# Patient Record
Sex: MALE | Race: WHITE | Employment: FULL TIME | URBAN - NONMETROPOLITAN AREA
[De-identification: names, ages, dates, MRNs, and addresses within clinical notes are randomized per-mention and may not be internally consistent; named-entity substitution may affect disease eponyms.]

---

## 2019-03-28 ENCOUNTER — OFFICE VISIT (OUTPATIENT)
Dept: URGENT CARE | Age: 49
End: 2019-03-28
Payer: OTHER GOVERNMENT

## 2019-03-28 VITALS
TEMPERATURE: 99 F | BODY MASS INDEX: 23.77 KG/M2 | HEART RATE: 98 BPM | DIASTOLIC BLOOD PRESSURE: 108 MMHG | OXYGEN SATURATION: 98 % | RESPIRATION RATE: 18 BRPM | WEIGHT: 166 LBS | SYSTOLIC BLOOD PRESSURE: 158 MMHG | HEIGHT: 70 IN

## 2019-03-28 DIAGNOSIS — F17.200 SMOKER: ICD-10-CM

## 2019-03-28 DIAGNOSIS — J06.9 URI, ACUTE: ICD-10-CM

## 2019-03-28 DIAGNOSIS — L02.01 ABSCESS OF FACE: Primary | ICD-10-CM

## 2019-03-28 PROCEDURE — 99214 OFFICE O/P EST MOD 30 MIN: CPT | Performed by: SPECIALIST

## 2019-03-28 PROCEDURE — 96372 THER/PROPH/DIAG INJ SC/IM: CPT | Performed by: SPECIALIST

## 2019-03-28 RX ORDER — SULFAMETHOXAZOLE AND TRIMETHOPRIM 800; 160 MG/1; MG/1
1 TABLET ORAL 2 TIMES DAILY
Qty: 20 TABLET | Refills: 0 | Status: SHIPPED | OUTPATIENT
Start: 2019-03-28 | End: 2019-04-07

## 2019-03-28 RX ORDER — CEFTRIAXONE 500 MG/1
500 INJECTION, POWDER, FOR SOLUTION INTRAMUSCULAR; INTRAVENOUS ONCE
Status: COMPLETED | OUTPATIENT
Start: 2019-03-28 | End: 2019-03-28

## 2019-03-28 RX ORDER — AMLODIPINE BESYLATE AND BENAZEPRIL HYDROCHLORIDE 10; 40 MG/1; MG/1
1 CAPSULE ORAL DAILY
COMMUNITY

## 2019-03-28 RX ORDER — MUPIROCIN CALCIUM 20 MG/G
CREAM TOPICAL
Qty: 30 G | Refills: 0 | Status: SHIPPED | OUTPATIENT
Start: 2019-03-28 | End: 2021-08-06

## 2019-03-28 RX ADMIN — CEFTRIAXONE 500 MG: 500 INJECTION, POWDER, FOR SOLUTION INTRAMUSCULAR; INTRAVENOUS at 17:55

## 2019-03-28 ASSESSMENT — ENCOUNTER SYMPTOMS
COUGH: 1
SORE THROAT: 1

## 2019-04-16 ENCOUNTER — OFFICE VISIT (OUTPATIENT)
Dept: URGENT CARE | Age: 49
End: 2019-04-16
Payer: OTHER GOVERNMENT

## 2019-04-16 VITALS
DIASTOLIC BLOOD PRESSURE: 96 MMHG | OXYGEN SATURATION: 98 % | TEMPERATURE: 98.6 F | HEIGHT: 70 IN | WEIGHT: 165 LBS | HEART RATE: 104 BPM | SYSTOLIC BLOOD PRESSURE: 150 MMHG | RESPIRATION RATE: 18 BRPM | BODY MASS INDEX: 23.62 KG/M2

## 2019-04-16 DIAGNOSIS — T24.202A SECOND DEGREE BURN OF LEFT LEG, INITIAL ENCOUNTER: Primary | ICD-10-CM

## 2019-04-16 PROCEDURE — 99213 OFFICE O/P EST LOW 20 MIN: CPT | Performed by: NURSE PRACTITIONER

## 2019-04-16 RX ORDER — CHLORHEXIDINE GLUCONATE 4 G/100ML
SOLUTION TOPICAL
Qty: 118 ML | Refills: 0 | Status: SHIPPED | OUTPATIENT
Start: 2019-04-16 | End: 2019-04-30

## 2019-04-16 RX ORDER — CLINDAMYCIN HYDROCHLORIDE 300 MG/1
300 CAPSULE ORAL 3 TIMES DAILY
Qty: 14 CAPSULE | Refills: 0 | Status: SHIPPED | OUTPATIENT
Start: 2019-04-16 | End: 2019-04-19 | Stop reason: SDUPTHER

## 2019-04-16 ASSESSMENT — ENCOUNTER SYMPTOMS
EYES NEGATIVE: 1
BURN: 1
COLOR CHANGE: 1
RESPIRATORY NEGATIVE: 1

## 2019-04-16 NOTE — PROGRESS NOTES
[id number] [facility], [place]     Referral Priority:   Urgent     Referral Type:   Eval and Treat     Referral Reason:   Specialty Services Required     Requested Specialty:   Wound Care     Number of Visits Requested:   1        No follow-ups on file. Orders Placed This Encounter   Procedures   [address] [facility], [place]     Referral Priority:   Urgent     Referral Type:   Eval and Treat     Referral Reason:   Specialty Services Required     Requested Specialty:   Wound Care     Number of Visits Requested:   1     Orders Placed This Encounter   Medications    chlorhexidine (HIBICLENS) 4 % external liquid     Sig: Apply topically daily as needed. Dispense:  118 mL     Refill:  0    clindamycin (CLEOCIN) 300 MG capsule     Sig: Take 1 capsule by mouth 3 times daily for 7 days     Dispense:  14 capsule     Refill:  0    silver sulfADIAZINE (SILVADENE) 1 % cream     Sig: Apply topically daily. Dispense:  25 g     Refill:  0       Patient given educationalmaterials - see patient instructions. Discussed use, benefit, and side effectsof prescribed medications. All patient questions answered. Pt voiced understanding. Reviewed health maintenance. Instructed to continue current medications, diet andexercise. Patient agreed with treatment plan. Follow up as directed. Patient Instructions   1. Take antibiotic as prescribed  2. Apply silvadene cream as directed  3. Wash 4 times a day with hibiclens  4. Follow up with wound care appt  5. Go to ER with increased signs of infection: fever, streaks, swelling  6. Follow up with PCP as needed  7.  Follow up with UC as needed        Electronically signed by KEVLIN Durant CNP on 4/16/2019 at 5:45 PM

## 2019-04-18 ENCOUNTER — HOSPITAL ENCOUNTER (OUTPATIENT)
Dept: WOUND CARE | Age: 49
Discharge: HOME | End: 2019-04-18
Payer: OTHER GOVERNMENT

## 2019-04-18 VITALS — RESPIRATION RATE: 18 BRPM | HEART RATE: 78 BPM | TEMPERATURE: 97.5 F

## 2019-04-18 DIAGNOSIS — L97.812 NON-PRESSURE CHRONIC ULCER OF OTHER PART OF RIGHT LOWER LEG WITH FAT LAYER EXPOSED (HCC): ICD-10-CM

## 2019-04-18 DIAGNOSIS — T24.031A BURN OF RIGHT LOWER LEG, INITIAL ENCOUNTER: ICD-10-CM

## 2019-04-18 PROCEDURE — 99213 OFFICE O/P EST LOW 20 MIN: CPT

## 2019-04-18 PROCEDURE — 99214 OFFICE O/P EST MOD 30 MIN: CPT | Performed by: NURSE PRACTITIONER

## 2019-04-18 NOTE — PLAN OF CARE
Problem: Wound:  Goal: Will show signs of wound healing; wound closure and no evidence of infection  Description  Will show signs of wound healing; wound closure and no evidence of infection  Outcome: Ongoing     Problem: Smoking cessation:  Goal: Ability to formulate a plan to maintain a tobacco-free life will be supported  Description  Ability to formulate a plan to maintain a tobacco-free life will be supported  Outcome: Ongoing
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
## 2019-04-19 ENCOUNTER — TELEPHONE (OUTPATIENT)
Dept: URGENT CARE | Age: 49
End: 2019-04-19

## 2019-04-19 DIAGNOSIS — T24.202A SECOND DEGREE BURN OF LEFT LEG, INITIAL ENCOUNTER: ICD-10-CM

## 2019-04-19 RX ORDER — CLINDAMYCIN HYDROCHLORIDE 300 MG/1
300 CAPSULE ORAL 3 TIMES DAILY
Qty: 9 CAPSULE | Refills: 0 | Status: SHIPPED | OUTPATIENT
Start: 2019-04-19 | End: 2019-04-22

## 2019-04-19 NOTE — TELEPHONE ENCOUNTER
Patient called in, said that his order for the antibiotic was only written for 14 tablets but should have been for 21. Said that the pharmacy will not give him the 7 other tablets without a new order.  Told him that I would send a message to one of our providers and we will call him back.--HC, LPN

## 2021-08-03 DIAGNOSIS — D75.1 POLYCYTHEMIA: Primary | ICD-10-CM

## 2021-08-05 NOTE — PROGRESS NOTES
OP HEMATOLOGY/ONCOLOGY CONSULTATION      Pt Name: Kolton Puri  YOB: 1970  MRN: [id number]  Referring provider: Layo Avalos   PCP: Bailey Leija      Date of evaluation: 8/6/2021   History Obtained From:  patient, electronic medical record    CHIEF COMPLAINT:    Chief Complaint   Patient presents with    New Patient     D75.1 Polycythemia Worsening without clear etiology     HISTORY OF PRESENT ILLNESS:    Kolton Puri is a 46 y.o.  male referred by Dr. Abad Roach for evaluation polycythemia. CBC 7/13/2021 revealed WBC 6.9 with 62% PMN, 20% lymphocyte, 8% monocyte, 2% eosinophil, 0% basophil, Hgb 20.4, HCT 57.3, ,000. CMP 7/13/2021 negative    He does smoke 1 pack/day of cigars a day at present. Prior to that 10 years ago he was smoking 1.5 pack of cigarettes per day since he was a teenager. He previously received testosterone injections but has not received any since 2018. He was sent for a therapeutic phlebotomy at [facility] 2 weeks ago. He did have the phlebotomy and he noticed that he was having chest pressure prior to the phlebotomy and that symptoms subsided. He did previously work for [employer] but has not had any of the screening CT imaging of the chest performed. He has hypertension and is on Lotrel. He does take Zoloft for anxiety.     Past Medical History:   Diagnosis Date    Depression     Hyperlipidemia     Hypertension        Past Surgical History:   Procedure Laterality Date    ANKLE SURGERY      HERNIA REPAIR      TONSILLECTOMY           Current Outpatient Medications:     amLODIPine-benazepril (LOTREL) 10-40 MG per capsule, Take 1 capsule by mouth daily, Disp: , Rfl:     sertraline (ZOLOFT) 100 MG tablet, , Disp: , Rfl: 3     No Known Allergies    Social History     Tobacco Use    Smoking status: Current Every Day Smoker     Packs/day: 1.00     Years: 15.00     Pack years: 15.00    Smokeless tobacco: Former User   Vaping Use    Vaping Use: Never used   Substance Use Topics    Alcohol use: Yes     Alcohol/week: 21.0 standard drinks     Types: 21 Cans of beer per week    Drug use: No       Family History   Problem Relation Age of Onset    Stroke Mother     High Blood Pressure Mother     Diabetes Mother     High Blood Pressure Father     Cancer Neg Hx     Kidney Disease Neg Hx        Subjective   REVIEW OF SYSTEMS:   Review of Systems   Constitutional: Negative for chills, diaphoresis, fatigue, fever and unexpected weight change. HENT: Negative for mouth sores, nosebleeds, sore throat, trouble swallowing and voice change. Eyes: Negative for photophobia, discharge and itching. Respiratory: Negative for cough, shortness of breath and wheezing. Cardiovascular: Negative for chest pain, palpitations and leg swelling. Gastrointestinal: Positive for diarrhea. Negative for abdominal distention, abdominal pain, blood in stool, constipation, nausea and vomiting. Endocrine: Negative for cold intolerance, heat intolerance, polydipsia and polyuria. Genitourinary: Negative for difficulty urinating, dysuria, hematuria and urgency. Musculoskeletal: Negative for arthralgias, back pain, joint swelling and myalgias. Skin: Negative for color change and rash. Neurological: Negative for dizziness, tremors, seizures, syncope and light-headedness. Hematological: Negative for adenopathy. Does not bruise/bleed easily. Psychiatric/Behavioral: Negative for behavioral problems and suicidal ideas. The patient is not nervous/anxious. All other systems reviewed and are negative. Objective   BP (!) 142/86   Pulse 96   Ht 5' 10\" (1.778 m)   Wt 164 lb 3.2 oz (74.5 kg)   SpO2 96%   BMI 23.56 kg/m²     PHYSICAL EXAM:  Physical Exam  Vitals reviewed. Constitutional:       General: He is not in acute distress. Appearance: He is well-developed. HENT:      Head: Normocephalic and atraumatic. Nose: Nose normal.   Eyes:      General: No scleral icterus. Conjunctiva/sclera: Conjunctivae normal.   Neck:      Vascular: No JVD. Trachea: No tracheal deviation. Cardiovascular:      Rate and Rhythm: Normal rate and regular rhythm. Heart sounds: Normal heart sounds. No murmur heard. Pulmonary:      Effort: Pulmonary effort is normal. No respiratory distress. Breath sounds: Normal breath sounds. No wheezing. Abdominal:      General: Bowel sounds are normal. There is no distension. Palpations: Abdomen is soft. There is no splenomegaly or mass. Tenderness: There is no abdominal tenderness. Musculoskeletal:         General: No tenderness or deformity. Normal range of motion. Lymphadenopathy:      Cervical: No cervical adenopathy. Upper Body:      Right upper body: No supraclavicular or axillary adenopathy. Left upper body: Axillary adenopathy (1 cm-shoddy) present. No supraclavicular adenopathy. Lower Body: No right inguinal adenopathy. No left inguinal adenopathy. Skin:     Findings: No erythema or rash. Neurological:      Mental Status: He is alert and oriented to person, place, and time. Psychiatric:         Thought Content: Thought content normal.     CBC reviewed by me  Lab Results   Component Value Date    WBC 6.38 08/06/2021    HGB 18.8 (HH) 08/06/2021    HCT 56.0 (HH) 08/06/2021    MCV 95.4 (H) 08/06/2021     08/06/2021     Lab Results   Component Value Date    NEUTROABS 3.33 08/06/2021       VISIT DIAGNOSES  1. Polycythemia        ASSESSMENT/PLAN:    1. Polycythemia    CBC 7/13/2021 revealed WBC 6.9 with 62% PMN, 20% lymphocyte, 8% monocyte, 2% eosinophil, 0% basophil, Hgb 20.4, HCT 57.3, ,000. CMP 7/13/2021 negative    He does smoke 1 pack/day of cigars a day at present. Prior to that 10 years ago he was smoking 1.5 pack of cigarettes per day since he was a teenager.     He previously received testosterone injections but has not received any since 2018. He was sent for a therapeutic phlebotomy at [facility] 2 weeks ago. He did have the phlebotomy and he noticed that he was having chest pressure prior to the phlebotomy and that symptoms subsided. He did previously work for [employer] but has not had any of the screening CT imaging of the chest performed. CBC today reveals a normal WBC of 6.38 with normal percent differential.  PLT is normal at 190,000. Hgb elevated at 18.8, HCT 56. He denies any headaches, chest tightness today. He does feel as if he cannot get in a good deep breath. I discussed potential causes-pulmonary etiology, bone marrow disorders-myeloproliferative disorders. JAK2 with reflex will be requested today. Erythropoietin level will be requested as well. At present we will phlebotomize him to get his hematocrit below 53 however if his JAK2 is positive our goal will be to get him below 45. He does not have any splenomegaly on exam.    2.  Nicotine dependence. I encouraged a chest x-ray. He does not qualify for low-dose CT imaging yet since he has not not reached 52years of age. He is going to try to see if he can get a screening CT performed through the workers health program since he was an employee at [employer]. Smoking cessation was encouraged but he does not have any plans to stop smoking at present. · Prostate cancer screening-defer to PCP    · Colon cancer screen. He has never had a colonoscopy. He denies any family history of colon cancer. He does have Cologuard box that he is going to be sending in. Thank you Dr. Flaco Lemons for referring Zulma Phillip for evaluation. Orders Placed This Encounter   Procedures    Ferritin    Iron and TIBC    Erythropoietin    Miscellaneous Sendout 1        Return in about 2 weeks (around 8/20/2021) for With Dorcas  and therapeutic phlebotomy.       Ed Baer PA-C  11:05 AM  8/6/2021

## 2021-08-06 ENCOUNTER — OFFICE VISIT (OUTPATIENT)
Dept: HEMATOLOGY | Age: 51
End: 2021-08-06
Payer: OTHER GOVERNMENT

## 2021-08-06 ENCOUNTER — HOSPITAL ENCOUNTER (OUTPATIENT)
Dept: INFUSION THERAPY | Age: 51
Discharge: HOME | End: 2021-08-06
Payer: OTHER GOVERNMENT

## 2021-08-06 VITALS
BODY MASS INDEX: 23.51 KG/M2 | HEART RATE: 96 BPM | WEIGHT: 164.2 LBS | HEIGHT: 70 IN | SYSTOLIC BLOOD PRESSURE: 142 MMHG | OXYGEN SATURATION: 96 % | DIASTOLIC BLOOD PRESSURE: 86 MMHG

## 2021-08-06 DIAGNOSIS — D75.1 POLYCYTHEMIA: Primary | ICD-10-CM

## 2021-08-06 DIAGNOSIS — D75.1 POLYCYTHEMIA: ICD-10-CM

## 2021-08-06 LAB
BASOPHILS ABSOLUTE: 0.03 K/UL (ref 0.01–0.08)
BASOPHILS RELATIVE PERCENT: 0.5 % (ref 0.1–1.2)
EOSINOPHILS ABSOLUTE: 0.23 K/UL (ref 0.04–0.54)
EOSINOPHILS RELATIVE PERCENT: 3.6 % (ref 0.7–7)
HCT VFR BLD CALC: 56 % (ref 40.1–51)
HEMOGLOBIN: 18.8 G/DL (ref 13.7–17.5)
LYMPHOCYTES ABSOLUTE: 2.29 K/UL (ref 1.18–3.74)
LYMPHOCYTES RELATIVE PERCENT: 35.9 % (ref 19.3–53.1)
MCH RBC QN AUTO: 32 PG (ref 25.7–32.2)
MCHC RBC AUTO-ENTMCNC: 33.6 G/DL (ref 32.3–36.5)
MCV RBC AUTO: 95.4 FL (ref 79–92.2)
MONOCYTES ABSOLUTE: 0.5 K/UL (ref 0.24–0.82)
MONOCYTES RELATIVE PERCENT: 7.8 % (ref 4.7–12.5)
NEUTROPHILS ABSOLUTE: 3.33 K/UL (ref 1.56–6.13)
NEUTROPHILS RELATIVE PERCENT: 52.2 % (ref 34–71.1)
PDW BLD-RTO: 13.4 % (ref 11.6–14.4)
PLATELET # BLD: 198 K/UL (ref 163–337)
PMV BLD AUTO: 10.4 FL (ref 7.4–10.4)
RBC # BLD: 5.87 M/UL (ref 4.63–6.08)
WBC # BLD: 6.38 K/UL (ref 4.23–9.07)

## 2021-08-06 PROCEDURE — 99195 PHLEBOTOMY: CPT

## 2021-08-06 PROCEDURE — 99203 OFFICE O/P NEW LOW 30 MIN: CPT | Performed by: PHYSICIAN ASSISTANT

## 2021-08-06 PROCEDURE — 99212 OFFICE O/P EST SF 10 MIN: CPT

## 2021-08-06 PROCEDURE — 85025 COMPLETE CBC W/AUTO DIFF WBC: CPT

## 2021-08-06 PROCEDURE — 36415 COLL VENOUS BLD VENIPUNCTURE: CPT

## 2021-08-06 ASSESSMENT — ENCOUNTER SYMPTOMS
PHOTOPHOBIA: 0
EYE ITCHING: 0
TROUBLE SWALLOWING: 0
ABDOMINAL DISTENTION: 0
VOMITING: 0
COLOR CHANGE: 0
NAUSEA: 0
VOICE CHANGE: 0
DIARRHEA: 1
COUGH: 0
EYE DISCHARGE: 0
BACK PAIN: 0
ABDOMINAL PAIN: 0
BLOOD IN STOOL: 0
SHORTNESS OF BREATH: 0
SORE THROAT: 0
CONSTIPATION: 0
WHEEZING: 0

## 2021-08-06 NOTE — PATIENT INSTRUCTIONS
Patient Education        Polycythemia: Care Instructions  Your Care Instructions     Polycythemia (say \"paw-sonya-sy-THEE-mak-uh) is an abnormal increase in red blood cells. It happens when the tissue inside your bones (bone marrow) makes too much blood. It also can occur if your blood does not have enough liquid, or plasma. This can make the number of red blood cells seem higher than normal. The extra red blood cells make your blood thicker than normal. This may raise your risk for blood clots that can cause heart attacks or strokes. Clots can form in the deep veins of the body, a condition called deep vein thrombosis. Or, a clot can travel through the blood to a lung (a pulmonary embolism). Your doctor may treat you by taking out some of your blood (phlebotomy). The process is like donating blood. Your doctor may even recommend that you donate blood. You may take pills to stop your body from making red blood cells. You also will get treatment for any other conditions that may cause your body to make too many red blood cells. Follow-up care is a key part of your treatment and safety. Be sure to make and go to all appointments, and call your doctor if you are having problems. It's also a good idea to know your test results and keep a list of the medicines you take. How can you care for yourself at home? · Be safe with medicines. Take your medicines exactly as prescribed. Call your doctor if you think you are having a problem with your medicine. · Drink plenty of fluids before and after you have blood removed. If you have kidney, heart, or liver disease and have to limit fluids, talk with your doctor before you increase the amount of fluids you drink. · Take it easy after you have had blood removed. Do not do vigorous exercise. · If your doctor recommends aspirin, take it exactly as prescribed. Call your doctor if you think you are having a problem with your medicine. · Do not smoke.  Smoking increases the VERIFIED DNR STATUS -2 NURSES AT BS- NO COMPRESSIONS AND NO MEDS- DAUGHTER SAMMY -MEDICAL POA.  NOTIFIED DR JALLOH.   risk of blood clots and may reduce the amount of oxygen in your blood. If you need help quitting, talk to your doctor about stop-smoking programs and medicines. These can increase your chances of quitting for good. · Take an antihistamine, such as a nondrowsy one like loratadine (Claritin) or one that might make you sleepy like diphenhydramine (Benadryl), if your skin is itchy. Some people who have this condition have itching. · Wear medical alert jewelry that lists your clotting problem. You can buy this at most [employer]. When should you call for help? Call 911 anytime you think you may need emergency care. For example, call if:    · You have sudden chest pain and shortness of breath, or you cough up blood.     · You have symptoms of a stroke. These may include:  ? Sudden numbness, tingling, weakness, or loss of movement in your face, arm, or leg, especially on only one side of your body. ? Sudden vision changes. ? Sudden trouble speaking. ? Sudden confusion or trouble understanding simple statements. ? Sudden problems with walking or balance. ? A sudden, severe headache that is different from past headaches.     · You have symptoms of a heart attack. These may include:  ? Chest pain or pressure, or a strange feeling in the chest.  ? Sweating. ? Shortness of breath. ? Nausea or vomiting. ? Pain, pressure, or a strange feeling in the back, neck, jaw, or upper belly or in one or both shoulders or arms. ? Lightheadedness or sudden weakness. ? A fast or irregular heartbeat. After you call 911, the  may tell you to chew 1 adult-strength or 2 to 4 low-dose aspirin. Wait for an ambulance. Do not try to drive yourself. Call your doctor now or seek immediate medical care if:    · You have signs of a blood clot, such as:  ? Pain in your calf, back of knee, thigh, or groin. ? Redness and swelling in your leg or groin.    Watch closely for changes in your health, and be sure to contact your doctor if you have any problems. Where can you learn more? Go to https://chpepiceweb.[email or web address] org and sign in to your [employer] account. Enter Q458 in the [employer] box to learn more about \"Polycythemia: Care Instructions. \"     If you do not have an account, please click on the \"Sign Up Now\" link. Current as of: September 23, 2020               Content Version: 12.9  © 2006-2021 [employer] Inc[employer]. Care instructions adapted under license by [facility] ([facility]). If you have questions about a medical condition or this instruction, always ask your healthcare professional. [address] any warranty or liability for your use of this information.

## 2021-08-06 NOTE — PROGRESS NOTES
THERAPEUTIC PHLEBOTOMY    Most recent Hemoglobin 18.8 Gm/dl and Hematocrit 56.0%    Date obtained: 08 /06 /2021    Pre-phlebotomy Vital Signs: Refer to Doc flow sheet    Start time: 11:05 AM    Tourniquet placed on patient's arm and arm palpated for venous access. Area of venous access cleansed with alcohol. Sheath removed from the needle and needle inserted into the left antecubital site. Blood flowing well down the tubing into collection bag. Adjustment/no adjustment of needle needed to maintain adequate blood flow. Scale monitoring amount of blood in bag. Stop Time: 11:17  Needle removed from patient's arm. Pressure applied to patient's arm until bleeding stopped. Dry sterile dressing applied over puncture site and secured with Coban/self-adherent wrap. Final amount of blood removed: 500ml  Post Vital Signs: Refer to Doc flow sheet      Patient tolerated procedure well. No redness, edema, or signs of active bleeding noted. Discharge Instructions provided: No heavy pushing or lifting for the next 24 hours. Keep dressing dry and in place for 4 to 6 hours. If a fever GREATER than 100.5 develops, contact office. Patient discharged ambulatory with belongings.

## 2021-09-02 NOTE — PROGRESS NOTES
Progress Note      Pt Name: Diane Mcguire  YOB: 1970  MRN: [id number]    Date of evaluation: 9/3/2021  History Obtained From:  patient, electronic medical record    CHIEF COMPLAINT:    Chief Complaint   Patient presents with    Follow-up     Polycythemia     Current active problems  Secondary polycythemia    HISTORY OF PRESENT ILLNESS:    Diane Mcguire is a 46 y.o.  male seen initially in the office on 2021 for evaluation of polycythemia. Baseline serology was obtained and he is here to review the results. He has tolerated therapeutic phlebotomies thus far. He did go and donate blood 2 weeks ago. 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    He has hypertension and is on Lotrel. He does take Zoloft for anxiety. HEMATOLOGY HISTORY: Secondary polycythemia  [facility] was seen in hematology consultation on 2021 referred by Dr. Kristina Smith for evaluation polycythemia.     CBC 2021 revealed WBC 6.9 with 62% PMN, 20% lymphocyte, 8% monocyte, 2% eosinophil, 0% basophil, Hgb 20.4, HCT 57.3, ,000.     CMP 2021 negative     He does smoke 1 pack/day of cigars a day at present. Prior to that 10 years ago he was smoking 1.5 pack of cigarettes per day since he was a teenager.     He previously received testosterone injections but has not received any since 2018.     He was sent for a therapeutic phlebotomy at [facility] 2 weeks ago. He did have the phlebotomy and he noticed that he was having chest pressure prior to the phlebotomy and that symptoms subsided.     He did previously work for [employer] but has not had any of the screening CT imaging of the chest performed.     His initial CBC in the office on 2021 revealed a normal WBC of 6.38 with normal percent differential.  PLT is normal at 190,000. Hgb elevated at 18.8, HCT 56.       He denied any headaches, chest tightness.       He did not have any splenomegaly on physical examination. I discussed potential causes-pulmonary etiology, bone marrow disorders-myeloproliferative disorders. JAK2 with reflex will be requested today. Erythropoietin level will be requested as well. Serology 8/6/2021  Serum Fe - 60  TIBC - 353  Fe sat - 17%  Ferritin - 97    Epo - 9.6 - WNL    LAMONTE 2 - V617F negative  CALR mutation - negative   LAMONTE 2 Exons 12-15 - negative  MPL - negative     At present we will phlebotomize him to get his hematocrit below 53.                 Past Medical History:   Diagnosis Date    Depression     Hyperlipidemia     Hypertension         Past Surgical History:   Procedure Laterality Date    ANKLE SURGERY      HERNIA REPAIR      TONSILLECTOMY             Current Outpatient Medications:     amLODIPine-benazepril (LOTREL) 10-40 MG per capsule, Take 1 capsule by mouth daily, Disp: , Rfl:     sertraline (ZOLOFT) 100 MG tablet, , Disp: , Rfl: 3     No Known Allergies    Social History     Tobacco Use    Smoking status: Current Every Day Smoker     Packs/day: 1.00     Years: 15.00     Pack years: 15.00    Smokeless tobacco: Former User   Vaping Use    Vaping Use: Never used   Substance Use Topics    Alcohol use: Yes     Alcohol/week: 21.0 standard drinks     Types: 21 Cans of beer per week    Drug use: No       Family History   Problem Relation Age of Onset    Stroke Mother     High Blood Pressure Mother     Diabetes Mother     High Blood Pressure Father     Cancer Neg Hx     Kidney Disease Neg Hx        Subjective   REVIEW OF SYSTEMS:   Review of Systems   Constitutional: Negative for chills, diaphoresis, fatigue, fever and unexpected weight change. HENT: Negative for mouth sores, nosebleeds, sore throat, trouble swallowing and voice change. Eyes: Negative for photophobia, discharge and itching. Respiratory: Negative for cough, shortness of breath and wheezing.     Cardiovascular: Negative for chest pain, palpitations and leg swelling. Gastrointestinal: Positive for diarrhea. Negative for abdominal distention, abdominal pain, blood in stool, constipation, nausea and vomiting. Endocrine: Negative for cold intolerance, heat intolerance, polydipsia and polyuria. Genitourinary: Negative for difficulty urinating, dysuria, hematuria and urgency. Musculoskeletal: Negative for arthralgias, back pain, joint swelling and myalgias. Skin: Negative for color change and rash. Neurological: Negative for dizziness, tremors, seizures, syncope and light-headedness. Hematological: Negative for adenopathy. Does not bruise/bleed easily. Psychiatric/Behavioral: Negative for behavioral problems and suicidal ideas. The patient is not nervous/anxious. All other systems reviewed and are negative. Objective   BP (!) 160/98   Pulse 90   Ht 5' 10\" (1.778 m)   Wt 165 lb (74.8 kg)   SpO2 99%   BMI 23.68 kg/m²     PHYSICAL EXAM:  Physical Exam  Vitals reviewed. Constitutional:       General: He is not in acute distress. Appearance: He is well-developed. HENT:      Head: Normocephalic and atraumatic. Nose: Nose normal.   Eyes:      General: No scleral icterus. Conjunctiva/sclera: Conjunctivae normal.   Neck:      Vascular: No JVD. Trachea: No tracheal deviation. Cardiovascular:      Rate and Rhythm: Normal rate and regular rhythm. Heart sounds: Normal heart sounds. No murmur heard. Pulmonary:      Effort: Pulmonary effort is normal. No respiratory distress. Breath sounds: Normal breath sounds. No wheezing. Abdominal:      General: Bowel sounds are normal. There is no distension. Palpations: Abdomen is soft. There is no splenomegaly or mass. Tenderness: There is no abdominal tenderness. Musculoskeletal:         General: No tenderness or deformity. Normal range of motion. Lymphadenopathy:      Cervical: No cervical adenopathy.       Upper Body:      Right upper body: No supraclavicular or axillary adenopathy. Left upper body: No supraclavicular or axillary adenopathy. Lower Body: No right inguinal adenopathy. No left inguinal adenopathy. Skin:     Findings: No erythema or rash. Neurological:      Mental Status: He is alert and oriented to person, place, and time. Psychiatric:         Thought Content: Thought content normal.       CBC reviewed by me  Lab Results   Component Value Date    WBC 5.31 09/03/2021    HGB 18.0 (HH) 09/03/2021    HCT 54.5 (HH) 09/03/2021    MCV 94.0 (H) 09/03/2021     09/03/2021     Lab Results   Component Value Date    NEUTROABS 2.01 09/03/2021       VISIT DIAGNOSES  1. Secondary polycythemia    2. Cigarette nicotine dependence without complication        ASSESSMENT/PLAN:    1. Secondary polycythemia    His initial CBC in the office on 8/6/2021 revealed a normal WBC of 6.38 with normal percent differential.  PLT is normal at 190,000. Hgb elevated at 18.8, HCT 56.       He denied any headaches, chest tightness. He did not have any splenomegaly on physical examination. I discussed potential causes-pulmonary etiology, bone marrow disorders-myeloproliferative disorders. JAK2 with reflex will be requested today. Erythropoietin level will be requested as well. Serology 8/6/2021  Serum Fe - 60  TIBC - 353  Fe sat - 17%  Ferritin - 97    Epo - 9.6 - WNL    LAMONTE 2 - V617F negative  CALR mutation - negative   LAMONTE 2 Exons 12-15 - negative  MPL - negative    Serology above was unrevealing for primary bone marrow cause. Iron levels were normal.  He appears to have secondary polycythemia most likely from tobacco use. He is concerned he may have sleep apnea and I told him to discuss this with Dr. Aniya Clemens. HCT today is 54.5     At present we will phlebotomize him to get his hematocrit below 53. He will have a 500 cc phlebotomy today. He will return in 3 weeks for another phlebotomy if hematocrit is over 53.   He should be able to give blood,

## 2021-09-03 ENCOUNTER — OFFICE VISIT (OUTPATIENT)
Dept: HEMATOLOGY | Age: 51
End: 2021-09-03
Payer: OTHER GOVERNMENT

## 2021-09-03 ENCOUNTER — HOSPITAL ENCOUNTER (OUTPATIENT)
Dept: INFUSION THERAPY | Age: 51
Discharge: HOME | End: 2021-09-03
Payer: OTHER GOVERNMENT

## 2021-09-03 VITALS
HEIGHT: 70 IN | DIASTOLIC BLOOD PRESSURE: 98 MMHG | WEIGHT: 165 LBS | HEART RATE: 90 BPM | BODY MASS INDEX: 23.62 KG/M2 | SYSTOLIC BLOOD PRESSURE: 160 MMHG | OXYGEN SATURATION: 99 %

## 2021-09-03 DIAGNOSIS — F17.210 CIGARETTE NICOTINE DEPENDENCE WITHOUT COMPLICATION: ICD-10-CM

## 2021-09-03 DIAGNOSIS — D75.1 POLYCYTHEMIA: ICD-10-CM

## 2021-09-03 DIAGNOSIS — D75.1 SECONDARY POLYCYTHEMIA: Primary | ICD-10-CM

## 2021-09-03 LAB
BASOPHILS ABSOLUTE: 0.04 K/UL (ref 0.01–0.08)
BASOPHILS RELATIVE PERCENT: 0.8 % (ref 0.1–1.2)
EOSINOPHILS ABSOLUTE: 0.2 K/UL (ref 0.04–0.54)
EOSINOPHILS RELATIVE PERCENT: 3.8 % (ref 0.7–7)
HCT VFR BLD CALC: 54.5 % (ref 40.1–51)
HEMOGLOBIN: 18 G/DL (ref 13.7–17.5)
LYMPHOCYTES ABSOLUTE: 2.38 K/UL (ref 1.18–3.74)
LYMPHOCYTES RELATIVE PERCENT: 44.8 % (ref 19.3–53.1)
MCH RBC QN AUTO: 31 PG (ref 25.7–32.2)
MCHC RBC AUTO-ENTMCNC: 33 G/DL (ref 32.3–36.5)
MCV RBC AUTO: 94 FL (ref 79–92.2)
MONOCYTES ABSOLUTE: 0.68 K/UL (ref 0.24–0.82)
MONOCYTES RELATIVE PERCENT: 12.8 % (ref 4.7–12.5)
NEUTROPHILS ABSOLUTE: 2.01 K/UL (ref 1.56–6.13)
NEUTROPHILS RELATIVE PERCENT: 37.8 % (ref 34–71.1)
PDW BLD-RTO: 13 % (ref 11.6–14.4)
PLATELET # BLD: 219 K/UL (ref 163–337)
PMV BLD AUTO: 10 FL (ref 7.4–10.4)
RBC # BLD: 5.8 M/UL (ref 4.63–6.08)
WBC # BLD: 5.31 K/UL (ref 4.23–9.07)

## 2021-09-03 PROCEDURE — 99213 OFFICE O/P EST LOW 20 MIN: CPT | Performed by: PHYSICIAN ASSISTANT

## 2021-09-03 PROCEDURE — 99211 OFF/OP EST MAY X REQ PHY/QHP: CPT

## 2021-09-03 PROCEDURE — 36415 COLL VENOUS BLD VENIPUNCTURE: CPT

## 2021-09-03 PROCEDURE — 99195 PHLEBOTOMY: CPT

## 2021-09-03 PROCEDURE — 85025 COMPLETE CBC W/AUTO DIFF WBC: CPT

## 2021-09-03 ASSESSMENT — ENCOUNTER SYMPTOMS
SORE THROAT: 0
ABDOMINAL PAIN: 0
NAUSEA: 0
VOMITING: 0
ABDOMINAL DISTENTION: 0
SHORTNESS OF BREATH: 0
COUGH: 0
TROUBLE SWALLOWING: 0
PHOTOPHOBIA: 0
BLOOD IN STOOL: 0
EYE DISCHARGE: 0
WHEEZING: 0
CONSTIPATION: 0
VOICE CHANGE: 0
DIARRHEA: 1
BACK PAIN: 0
COLOR CHANGE: 0
EYE ITCHING: 0

## 2021-09-03 NOTE — PROGRESS NOTES
THERAPEUTIC PHLEBOTOMY    Most recent Hemoglobin 18.0 Gm/dl and Hematocrit 54.5%    Date obtained: 9 /3 /2021      Pre-phlebotomy Vital Signs: Refer to Doc flow sheet    Start time: 9:10 am     Tourniquet placed on patient's arm and arm palpated for venous access. Area of venous access cleansed with alcohol. Sheath removed from the needle and needle inserted into the right antecubital site. Blood flowing well down the tubing into collection bag. Adjustment/no adjustment of needle needed to maintain adequate blood flow. Scale monitoring amount of blood in bag. Stop Time: 9:25 am   Needle removed from patient's arm. Pressure applied to patient's arm until bleeding stopped. Dry sterile dressing applied over puncture site and secured with Coban/self-adherent wrap. Final amount of blood removed: 500cc  Post Vital Signs: Refer to Doc flow sheet      Patient tolerated procedure well. No redness, edema, or signs of active bleeding noted. Discharge Instructions provided: No heavy pushing or lifting for the next 24 hours. Keep dressing dry and in place for 4 to 6 hours. If a fever GREATER than 100.5 develops, contact office. Patient discharged ambulatory with belongings.

## 2023-11-13 ENCOUNTER — TELEPHONE (OUTPATIENT)
Dept: GASTROENTEROLOGY | Facility: CLINIC | Age: 53
End: 2023-11-13
Payer: OTHER GOVERNMENT

## 2023-11-13 NOTE — TELEPHONE ENCOUNTER
Letter sent via my chart and printed for mail to discuss fast track option for colonoscopy screening    Please verify if any family history of colon polyps or colon cancer present as well as if Lilia Smart has undergone previous colonoscopy

## 2024-05-30 ENCOUNTER — HOSPITAL ENCOUNTER (INPATIENT)
Facility: HOSPITAL | Age: 54
LOS: 3 days | Discharge: HOME | DRG: 280 | End: 2024-06-02
Attending: EMERGENCY MEDICINE | Admitting: INTERNAL MEDICINE
Payer: OTHER GOVERNMENT

## 2024-05-30 ENCOUNTER — APPOINTMENT (OUTPATIENT)
Dept: CT IMAGING | Facility: HOSPITAL | Age: 54
DRG: 280 | End: 2024-05-30
Payer: OTHER GOVERNMENT

## 2024-05-30 ENCOUNTER — APPOINTMENT (OUTPATIENT)
Dept: GENERAL RADIOLOGY | Facility: HOSPITAL | Age: 54
DRG: 280 | End: 2024-05-30
Payer: OTHER GOVERNMENT

## 2024-05-30 DIAGNOSIS — R91.1 PULMONARY NODULE: ICD-10-CM

## 2024-05-30 DIAGNOSIS — I21.4 NON-STEMI (NON-ST ELEVATED MYOCARDIAL INFARCTION): ICD-10-CM

## 2024-05-30 DIAGNOSIS — I16.1 HYPERTENSIVE EMERGENCY: Primary | ICD-10-CM

## 2024-05-30 LAB
ALBUMIN SERPL-MCNC: 4.3 G/DL (ref 3.5–5.2)
ALBUMIN/GLOB SERPL: 1.2 G/DL
ALP SERPL-CCNC: 100 U/L (ref 39–117)
ALT SERPL W P-5'-P-CCNC: 24 U/L (ref 1–41)
AMPHET+METHAMPHET UR QL: NEGATIVE
AMPHETAMINES UR QL: NEGATIVE
ANION GAP SERPL CALCULATED.3IONS-SCNC: 9 MMOL/L (ref 5–15)
AST SERPL-CCNC: 39 U/L (ref 1–40)
BARBITURATES UR QL SCN: NEGATIVE
BASOPHILS # BLD AUTO: 0.07 10*3/MM3 (ref 0–0.2)
BASOPHILS NFR BLD AUTO: 0.9 % (ref 0–1.5)
BENZODIAZ UR QL SCN: NEGATIVE
BILIRUB SERPL-MCNC: 0.4 MG/DL (ref 0–1.2)
BUN SERPL-MCNC: 4 MG/DL (ref 6–20)
BUN/CREAT SERPL: 5.8 (ref 7–25)
BUPRENORPHINE SERPL-MCNC: NEGATIVE NG/ML
CALCIUM SPEC-SCNC: 8.9 MG/DL (ref 8.6–10.5)
CANNABINOIDS SERPL QL: NEGATIVE
CHLORIDE SERPL-SCNC: 102 MMOL/L (ref 98–107)
CO2 SERPL-SCNC: 27 MMOL/L (ref 22–29)
COCAINE UR QL: NEGATIVE
CREAT SERPL-MCNC: 0.69 MG/DL (ref 0.76–1.27)
D DIMER PPP FEU-MCNC: <0.27 MCGFEU/ML (ref 0–0.56)
DEPRECATED RDW RBC AUTO: 49.1 FL (ref 37–54)
EGFRCR SERPLBLD CKD-EPI 2021: 108.6 ML/MIN/1.73
EOSINOPHIL # BLD AUTO: 0.06 10*3/MM3 (ref 0–0.4)
EOSINOPHIL NFR BLD AUTO: 0.7 % (ref 0.3–6.2)
ERYTHROCYTE [DISTWIDTH] IN BLOOD BY AUTOMATED COUNT: 18 % (ref 12.3–15.4)
FENTANYL UR-MCNC: NEGATIVE NG/ML
GEN 5 2HR TROPONIN T REFLEX: 333 NG/L
GLOBULIN UR ELPH-MCNC: 3.5 GM/DL
GLUCOSE SERPL-MCNC: 132 MG/DL (ref 65–99)
HCT VFR BLD AUTO: 55.2 % (ref 37.5–51)
HGB BLD-MCNC: 16.8 G/DL (ref 13–17.7)
HOLD SPECIMEN: NORMAL
IMM GRANULOCYTES # BLD AUTO: 0.03 10*3/MM3 (ref 0–0.05)
IMM GRANULOCYTES NFR BLD AUTO: 0.4 % (ref 0–0.5)
LYMPHOCYTES # BLD AUTO: 1.9 10*3/MM3 (ref 0.7–3.1)
LYMPHOCYTES NFR BLD AUTO: 23.3 % (ref 19.6–45.3)
MCH RBC QN AUTO: 24.8 PG (ref 26.6–33)
MCHC RBC AUTO-ENTMCNC: 30.4 G/DL (ref 31.5–35.7)
MCV RBC AUTO: 81.5 FL (ref 79–97)
METHADONE UR QL SCN: NEGATIVE
MONOCYTES # BLD AUTO: 0.88 10*3/MM3 (ref 0.1–0.9)
MONOCYTES NFR BLD AUTO: 10.8 % (ref 5–12)
NEUTROPHILS NFR BLD AUTO: 5.22 10*3/MM3 (ref 1.7–7)
NEUTROPHILS NFR BLD AUTO: 63.9 % (ref 42.7–76)
NRBC BLD AUTO-RTO: 0 /100 WBC (ref 0–0.2)
NT-PROBNP SERPL-MCNC: 1863 PG/ML (ref 0–900)
OPIATES UR QL: NEGATIVE
OXYCODONE UR QL SCN: NEGATIVE
PCP UR QL SCN: NEGATIVE
PLATELET # BLD AUTO: 277 10*3/MM3 (ref 140–450)
PMV BLD AUTO: 9.6 FL (ref 6–12)
POTASSIUM SERPL-SCNC: 4.6 MMOL/L (ref 3.5–5.2)
PROT SERPL-MCNC: 7.8 G/DL (ref 6–8.5)
RBC # BLD AUTO: 6.77 10*6/MM3 (ref 4.14–5.8)
SODIUM SERPL-SCNC: 138 MMOL/L (ref 136–145)
TRICYCLICS UR QL SCN: NEGATIVE
TROPONIN T DELTA: 39 NG/L
TROPONIN T SERPL HS-MCNC: 294 NG/L
WBC NRBC COR # BLD AUTO: 8.16 10*3/MM3 (ref 3.4–10.8)
WHOLE BLOOD HOLD COAG: NORMAL
WHOLE BLOOD HOLD SPECIMEN: NORMAL

## 2024-05-30 PROCEDURE — 84484 ASSAY OF TROPONIN QUANT: CPT | Performed by: EMERGENCY MEDICINE

## 2024-05-30 PROCEDURE — 4A023N7 MEASUREMENT OF CARDIAC SAMPLING AND PRESSURE, LEFT HEART, PERCUTANEOUS APPROACH: ICD-10-PCS | Performed by: INTERNAL MEDICINE

## 2024-05-30 PROCEDURE — 25510000001 IOPAMIDOL PER 1 ML: Performed by: INTERNAL MEDICINE

## 2024-05-30 PROCEDURE — 25010000002 FUROSEMIDE PER 20 MG: Performed by: INTERNAL MEDICINE

## 2024-05-30 PROCEDURE — C1894 INTRO/SHEATH, NON-LASER: HCPCS | Performed by: INTERNAL MEDICINE

## 2024-05-30 PROCEDURE — 93458 L HRT ARTERY/VENTRICLE ANGIO: CPT | Performed by: INTERNAL MEDICINE

## 2024-05-30 PROCEDURE — 99254 IP/OBS CNSLTJ NEW/EST MOD 60: CPT | Performed by: INTERNAL MEDICINE

## 2024-05-30 PROCEDURE — 99285 EMERGENCY DEPT VISIT HI MDM: CPT

## 2024-05-30 PROCEDURE — 25010000002 HEPARIN (PORCINE) 2000-0.9 UNIT/L-% SOLUTION: Performed by: INTERNAL MEDICINE

## 2024-05-30 PROCEDURE — 71045 X-RAY EXAM CHEST 1 VIEW: CPT

## 2024-05-30 PROCEDURE — 25010000002 MIDAZOLAM PER 1 MG: Performed by: INTERNAL MEDICINE

## 2024-05-30 PROCEDURE — 85379 FIBRIN DEGRADATION QUANT: CPT | Performed by: EMERGENCY MEDICINE

## 2024-05-30 PROCEDURE — 80053 COMPREHEN METABOLIC PANEL: CPT | Performed by: EMERGENCY MEDICINE

## 2024-05-30 PROCEDURE — 93005 ELECTROCARDIOGRAM TRACING: CPT | Performed by: EMERGENCY MEDICINE

## 2024-05-30 PROCEDURE — 94799 UNLISTED PULMONARY SVC/PX: CPT

## 2024-05-30 PROCEDURE — 25010000002 NICARDIPINE 2.5 MG/ML SOLUTION: Performed by: EMERGENCY MEDICINE

## 2024-05-30 PROCEDURE — 80307 DRUG TEST PRSMV CHEM ANLYZR: CPT | Performed by: EMERGENCY MEDICINE

## 2024-05-30 PROCEDURE — 85025 COMPLETE CBC W/AUTO DIFF WBC: CPT | Performed by: EMERGENCY MEDICINE

## 2024-05-30 PROCEDURE — 25510000001 IOPAMIDOL PER 1 ML: Performed by: EMERGENCY MEDICINE

## 2024-05-30 PROCEDURE — 83880 ASSAY OF NATRIURETIC PEPTIDE: CPT | Performed by: EMERGENCY MEDICINE

## 2024-05-30 PROCEDURE — 99152 MOD SED SAME PHYS/QHP 5/>YRS: CPT | Performed by: INTERNAL MEDICINE

## 2024-05-30 PROCEDURE — 25010000002 HEPARIN (PORCINE) 1000-0.9 UT/500ML-% SOLUTION: Performed by: INTERNAL MEDICINE

## 2024-05-30 PROCEDURE — 25010000002 FENTANYL CITRATE (PF) 50 MCG/ML SOLUTION: Performed by: INTERNAL MEDICINE

## 2024-05-30 PROCEDURE — 25010000002 ENOXAPARIN PER 10 MG: Performed by: INTERNAL MEDICINE

## 2024-05-30 PROCEDURE — C1760 CLOSURE DEV, VASC: HCPCS | Performed by: INTERNAL MEDICINE

## 2024-05-30 PROCEDURE — 25010000002 LABETALOL 5 MG/ML SOLUTION: Performed by: EMERGENCY MEDICINE

## 2024-05-30 PROCEDURE — 25810000003 SODIUM CHLORIDE 0.9 % SOLUTION: Performed by: EMERGENCY MEDICINE

## 2024-05-30 PROCEDURE — B2151ZZ FLUOROSCOPY OF LEFT HEART USING LOW OSMOLAR CONTRAST: ICD-10-PCS | Performed by: INTERNAL MEDICINE

## 2024-05-30 PROCEDURE — 93010 ELECTROCARDIOGRAM REPORT: CPT | Performed by: INTERNAL MEDICINE

## 2024-05-30 PROCEDURE — B2111ZZ FLUOROSCOPY OF MULTIPLE CORONARY ARTERIES USING LOW OSMOLAR CONTRAST: ICD-10-PCS | Performed by: INTERNAL MEDICINE

## 2024-05-30 PROCEDURE — 36415 COLL VENOUS BLD VENIPUNCTURE: CPT | Performed by: EMERGENCY MEDICINE

## 2024-05-30 PROCEDURE — 71275 CT ANGIOGRAPHY CHEST: CPT

## 2024-05-30 PROCEDURE — 25810000003 SODIUM CHLORIDE 0.9 % SOLUTION: Performed by: INTERNAL MEDICINE

## 2024-05-30 RX ORDER — ASPIRIN 81 MG/1
324 TABLET, CHEWABLE ORAL ONCE
Status: COMPLETED | OUTPATIENT
Start: 2024-05-30 | End: 2024-05-30

## 2024-05-30 RX ORDER — ENOXAPARIN SODIUM 100 MG/ML
1 INJECTION SUBCUTANEOUS EVERY 12 HOURS SCHEDULED
Status: DISCONTINUED | OUTPATIENT
Start: 2024-05-31 | End: 2024-05-30

## 2024-05-30 RX ORDER — HEPARIN SODIUM 200 [USP'U]/100ML
INJECTION, SOLUTION INTRAVENOUS
Status: DISCONTINUED | OUTPATIENT
Start: 2024-05-30 | End: 2024-05-30 | Stop reason: HOSPADM

## 2024-05-30 RX ORDER — ONDANSETRON 2 MG/ML
4 INJECTION INTRAMUSCULAR; INTRAVENOUS EVERY 6 HOURS PRN
Status: DISCONTINUED | OUTPATIENT
Start: 2024-05-30 | End: 2024-06-02 | Stop reason: HOSPADM

## 2024-05-30 RX ORDER — SODIUM CHLORIDE 0.9 % (FLUSH) 0.9 %
10 SYRINGE (ML) INJECTION AS NEEDED
Status: DISCONTINUED | OUTPATIENT
Start: 2024-05-30 | End: 2024-06-02 | Stop reason: HOSPADM

## 2024-05-30 RX ORDER — SODIUM CHLORIDE 9 MG/ML
100 INJECTION, SOLUTION INTRAVENOUS CONTINUOUS
Status: DISPENSED | OUTPATIENT
Start: 2024-05-30 | End: 2024-05-30

## 2024-05-30 RX ORDER — CHLORHEXIDINE GLUCONATE 500 MG/1
1 CLOTH TOPICAL EVERY 24 HOURS
Status: DISCONTINUED | OUTPATIENT
Start: 2024-05-31 | End: 2024-05-31

## 2024-05-30 RX ORDER — FUROSEMIDE 10 MG/ML
40 INJECTION INTRAMUSCULAR; INTRAVENOUS EVERY 12 HOURS
Status: DISCONTINUED | OUTPATIENT
Start: 2024-05-30 | End: 2024-06-02

## 2024-05-30 RX ORDER — NEBIVOLOL 5 MG/1
20 TABLET ORAL DAILY
Status: DISCONTINUED | OUTPATIENT
Start: 2024-05-30 | End: 2024-05-31

## 2024-05-30 RX ORDER — ACETAMINOPHEN 325 MG/1
650 TABLET ORAL EVERY 4 HOURS PRN
Status: DISCONTINUED | OUTPATIENT
Start: 2024-05-30 | End: 2024-06-02 | Stop reason: HOSPADM

## 2024-05-30 RX ORDER — LISINOPRIL 20 MG/1
40 TABLET ORAL
Status: DISCONTINUED | OUTPATIENT
Start: 2024-05-30 | End: 2024-06-02 | Stop reason: HOSPADM

## 2024-05-30 RX ORDER — HYDRALAZINE HYDROCHLORIDE 20 MG/ML
10 INJECTION INTRAMUSCULAR; INTRAVENOUS EVERY 6 HOURS PRN
Status: DISCONTINUED | OUTPATIENT
Start: 2024-05-30 | End: 2024-06-02 | Stop reason: HOSPADM

## 2024-05-30 RX ORDER — ENOXAPARIN SODIUM 100 MG/ML
1 INJECTION SUBCUTANEOUS EVERY 12 HOURS SCHEDULED
Status: DISCONTINUED | OUTPATIENT
Start: 2024-05-31 | End: 2024-06-02 | Stop reason: HOSPADM

## 2024-05-30 RX ORDER — CLOPIDOGREL BISULFATE 75 MG/1
75 TABLET ORAL DAILY
Status: DISCONTINUED | OUTPATIENT
Start: 2024-05-30 | End: 2024-06-02 | Stop reason: HOSPADM

## 2024-05-30 RX ORDER — MIDAZOLAM HYDROCHLORIDE 1 MG/ML
INJECTION INTRAMUSCULAR; INTRAVENOUS
Status: DISCONTINUED | OUTPATIENT
Start: 2024-05-30 | End: 2024-05-30 | Stop reason: HOSPADM

## 2024-05-30 RX ORDER — ASPIRIN 81 MG/1
81 TABLET ORAL DAILY
Status: DISCONTINUED | OUTPATIENT
Start: 2024-05-30 | End: 2024-06-02 | Stop reason: HOSPADM

## 2024-05-30 RX ORDER — ONDANSETRON 4 MG/1
4 TABLET, ORALLY DISINTEGRATING ORAL EVERY 6 HOURS PRN
Status: DISCONTINUED | OUTPATIENT
Start: 2024-05-30 | End: 2024-06-02 | Stop reason: HOSPADM

## 2024-05-30 RX ORDER — NITROGLYCERIN 0.4 MG/1
0.4 TABLET SUBLINGUAL
Status: DISCONTINUED | OUTPATIENT
Start: 2024-05-30 | End: 2024-05-31

## 2024-05-30 RX ORDER — AMLODIPINE BESYLATE 10 MG/1
10 TABLET ORAL
Status: DISCONTINUED | OUTPATIENT
Start: 2024-05-30 | End: 2024-06-02 | Stop reason: HOSPADM

## 2024-05-30 RX ORDER — HYDRALAZINE HYDROCHLORIDE 25 MG/1
25 TABLET, FILM COATED ORAL EVERY 8 HOURS SCHEDULED
Status: DISCONTINUED | OUTPATIENT
Start: 2024-05-30 | End: 2024-05-30

## 2024-05-30 RX ORDER — ENOXAPARIN SODIUM 100 MG/ML
1 INJECTION SUBCUTANEOUS ONCE
Status: COMPLETED | OUTPATIENT
Start: 2024-05-30 | End: 2024-05-30

## 2024-05-30 RX ORDER — CHLORHEXIDINE GLUCONATE 500 MG/1
1 CLOTH TOPICAL ONCE
Status: DISCONTINUED | OUTPATIENT
Start: 2024-05-30 | End: 2024-05-31

## 2024-05-30 RX ORDER — ASPIRIN 81 MG/1
81 TABLET ORAL DAILY
Status: DISCONTINUED | OUTPATIENT
Start: 2024-05-31 | End: 2024-05-31 | Stop reason: SDUPTHER

## 2024-05-30 RX ORDER — LABETALOL HYDROCHLORIDE 5 MG/ML
20 INJECTION, SOLUTION INTRAVENOUS ONCE
Status: COMPLETED | OUTPATIENT
Start: 2024-05-30 | End: 2024-05-30

## 2024-05-30 RX ORDER — AMLODIPINE AND VALSARTAN 10; 320 MG/1; MG/1
1 TABLET ORAL DAILY
COMMUNITY

## 2024-05-30 RX ORDER — LIDOCAINE HYDROCHLORIDE 20 MG/ML
INJECTION, SOLUTION INFILTRATION; PERINEURAL
Status: DISCONTINUED | OUTPATIENT
Start: 2024-05-30 | End: 2024-05-30 | Stop reason: HOSPADM

## 2024-05-30 RX ORDER — FENTANYL CITRATE 50 UG/ML
INJECTION, SOLUTION INTRAMUSCULAR; INTRAVENOUS
Status: DISCONTINUED | OUTPATIENT
Start: 2024-05-30 | End: 2024-05-30 | Stop reason: HOSPADM

## 2024-05-30 RX ADMIN — SODIUM CHLORIDE 100 ML/HR: 9 INJECTION, SOLUTION INTRAVENOUS at 14:21

## 2024-05-30 RX ADMIN — LABETALOL HYDROCHLORIDE 20 MG: 5 INJECTION, SOLUTION INTRAVENOUS at 09:37

## 2024-05-30 RX ADMIN — ASPIRIN 81 MG: 81 TABLET, COATED ORAL at 14:23

## 2024-05-30 RX ADMIN — ENOXAPARIN SODIUM 90 MG: 100 INJECTION SUBCUTANEOUS at 14:22

## 2024-05-30 RX ADMIN — Medication 1 APPLICATION: at 19:57

## 2024-05-30 RX ADMIN — IOPAMIDOL 100 ML: 755 INJECTION, SOLUTION INTRAVENOUS at 10:11

## 2024-05-30 RX ADMIN — NICARDIPINE HYDROCHLORIDE 5 MG/HR: 2.5 INJECTION, SOLUTION INTRAVENOUS at 10:50

## 2024-05-30 RX ADMIN — CLOPIDOGREL BISULFATE 75 MG: 75 TABLET, FILM COATED ORAL at 14:22

## 2024-05-30 RX ADMIN — ASPIRIN 81 MG CHEWABLE TABLET 324 MG: 81 TABLET CHEWABLE at 09:35

## 2024-05-30 RX ADMIN — FUROSEMIDE 40 MG: 10 INJECTION, SOLUTION INTRAMUSCULAR; INTRAVENOUS at 14:22

## 2024-05-30 NOTE — H&P
[facility]  HISTORY AND PHYSICAL    Date of Admission: 5/30/2024  Primary Care Physician: Daniel Mckeon MD    Subjective   Primary Historian: patient    Chief Complaint: NSTEMI 2, hypertensive emergency    History of Present Illness  55 y/o gentleman with pmh of HTN, Polycythemia, tobacco use coming in with with one day of intermittent chest pain, substernal, sharp and radiating to shoulders. Lasted for about 2 hours with recurrent episode.   He presented to the ED this morning and was found to have elevated blood pressure. He had stable EKG but elevated cardiac enzymes and BNP for which he was taken to cath lab. 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   Patient denies having similar episodes in past.  He reports his blood pressure is always high and he has been tried on different medications without adequate control.   Current tobacco user and alcohol use.   Patient was diagnosed a blood disorder ?polycythemia 2 years ago and gives blood every few months for the same.   Compliant w blood pressure medications. Reports he took them all this morning.       Review of Systems   Otherwise complete ROS reviewed and negative except as mentioned in the HPI.    Past Medical History:   Past Medical History:   Diagnosis Date   • Hypertension      Past Surgical History:  Past Surgical History:   Procedure Laterality Date   • ANKLE SURGERY     • HERNIA REPAIR     • TONSILLECTOMY       Social History:  reports that he has been smoking cigars. He has never used smokeless tobacco. He reports that he does not currently use alcohol. He reports that he does not use drugs.    Family History: family history includes Coronary artery disease in his mother; Diabetes in his mother; Hypertension in his father and mother; Stroke in his mother.       Allergies:  Allergies   Allergen Reactions   • Cat Hair Extract Hives  "      Medications:  Prior to Admission medications    Medication Sig Start Date End Date Taking? Authorizing Provider   amLODIPine-valsartan (EXFORGE)  MG per tablet Take 1 tablet by mouth Daily.   Yes Robert Cortes MD   aspirin 81 MG EC tablet Take 2 tablets by mouth Daily.   Yes Robert Cortes MD   nebivolol (BYSTOLIC) 20 MG tablet Take 1 tablet by mouth Daily.   Yes Robert Cortes MD   sertraline (ZOLOFT) 100 MG tablet Take 1.5 tablets by mouth Daily.   Yes Robert Cortes MD   amLODIPine-benazepril (LOTREL) 10-40 MG per capsule Take 1 capsule by mouth Daily.  5/30/24  Robert Cortes MD     I have utilized all available immediate resources to obtain, update, or review the patient's current medications (including all prescriptions, over-the-counter products, herbals, cannabis/cannabidiol products, and vitamin/mineral/dietary (nutritional) supplements).    Objective     Vital Signs: BP (!) 152/111   Pulse 74   Temp 98.8 °F (37.1 °C) (Oral)   Resp 20   Ht 152.4 cm (60\")   Wt 77.2 kg (170 lb 4.8 oz)   SpO2 97%   BMI 33.26 kg/m²   Physical Exam   GEN: nad,  Heent; eomi, mmm  Rs: ctab  Cvs: rrr, no mrg  Pa: soft, nt, nd, no hsm  Ext: no c/c/e      Results Reviewed:  XR Chest 1 View    Result Date: 5/30/2024  Shallow inspiration, no acute cardiopulmonary abnormality.   This report was signed and finalized on 5/30/2024 10:57 AM by Dr. Taye Magallon MD.      CT Angiogram Chest    Result Date: 5/30/2024  1. No evidence of aortic aneurysm or dissection. 2. No evidence of pulmonary embolism. 3. An 11 mm nodule in the superior segment of the left lower lobe. The possibility of neoplasm may not be excluded. Further evaluation by a follow-up CT scan of the chest in 3 months and/or PET/CT imaging may be obtained.            This report was signed and finalized on 5/30/2024 10:34 AM by Dr. Myranda Mccartney MD.       Result Review:  I have personally reviewed the results from the " time of this admission to 5/30/2024 13:34 CDT and agree with these findings:  []  Laboratory list / accordion  []  Microbiology  []  Radiology  []  EKG/Telemetry   []  Cardiology/Vascular   []  Pathology  []  Old records  []  Other:  Most notable findings include:     1.  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  Flow in this branch appears to be near normal with no obvious obstruction to flow at this time, therefore this dissection was left untreated as any attempts to treat the dissection might further propagate the defect and thus impair flow into the distal terminal branches.  2.  Moderate stenosis in a small caliber diagonal branch, otherwise mild disease in the LAD territory.  3.  Small caliber nondominant right coronary artery.      I have personally reviewed and interpreted the radiology studies and ECG obtained at time of admission.     Assessment / Plan   Assessment:   Active Hospital Problems    Diagnosis    • **Non-STEMI (non-ST elevated myocardial infarction)    • NSTEMI, initial episode of care    • Hypertensive emergency    Non obstructive CAD    Treatment Plan  Hypertensive emergency  --aggressive blood pressure lowering per cardiology recommendation  --resumed home amlo  --replaced valsartan with lisinopril  --resumed nebivolol, may swap to coreg tomorrow.  --continue on nicardipine drip  --cardiology has recommended lasix 40 iv bid  --added po hydral but cardene drip has been turned off, will reassess after pm dose of lasix. Can add hydral po back.     NSTEMI  --aspirin  --plavix  --statin  --therapeutic lovenox    ETOH use  --monitor for w/d    Polycythemia: could be secondary from nicotine use and undiagnosed VIC  --monitor   --outpatient pulmonary follow up for sleep apnea    Lung nodule; 11mm, incidental finding  --outpatient pulmonary follow up.         Medical Decision  Making  Number and Complexity of problems: 5  Differential Diagnosis: as above    Conditions and Status        Condition is improving.     MDM Data  External documents reviewed: no  Cardiac tracing (EKG, telemetry) interpretation: yes  Radiology interpretation: yes  Labs reviewed: yes  Any tests that were considered but not ordered: no       Discussed with: patient and sister     Care Planning  Shared decision making: yes  Code status and discussions: full    Disposition  Social Determinants of Health that impact treatment or disposition: -  Estimated length of stay is 2 days    I confirmed that the patient's advanced care plan is present, code status is documented, and a surrogate decision maker is listed in the patient's medical record.     The patient's surrogate decision maker is unknown.     The patient was seen and examined by me on 5/30/24 at 1:30pm.      Electronically signed by Steve Horton MD on 5/30/2024 at 13:34 CDT

## 2024-05-30 NOTE — ED PROVIDER NOTES
Subjective   History of Present Illness  Patient is a 44-year-old gentleman who had an episode of chest pain last night radiating to the neck and intrascapular area there is no history of heart disease but has history of high blood pressure and family history coronary disease with cigarette smoking.    Hypertension  Severity:  Moderate  Onset quality:  Gradual  Timing:  Constant  Progression:  Worsening  Chronicity:  Recurrent  Context: normal sodium, not caffeine, not drug abuse, not medication change and not stress    Relieved by:  Nothing  Worsened by:  Nothing  Ineffective treatments:  None tried  Associated symptoms: chest pain and nausea    Associated symptoms: no abdominal pain, no anxiety, no confusion, no dizziness, no ear pain, no fever, no headaches, no hematuria, no hypokalemia, no loss of consciousness, no neck pain, no peripheral edema, no syncope, not vomiting and no weakness    Risk factors: family hx of HTN    Risk factors: no alcohol use, no cocaine use, no kidney disease and no PVD        Review of Systems   Constitutional: Negative.  Negative for fever.   HENT: Negative.  Negative for ear pain.    Cardiovascular:  Positive for chest pain. Negative for syncope.   Gastrointestinal: Negative.  Positive for nausea. Negative for abdominal distention, abdominal pain and vomiting.   Endocrine: Negative.    Genitourinary: Negative.  Negative for hematuria.   Musculoskeletal: Negative.  Negative for back pain and neck pain.   Skin:  Negative for color change and pallor.   Neurological: Negative.  Negative for dizziness, loss of consciousness, syncope, weakness, light-headedness, numbness and headaches.   Hematological: Negative.  Does not bruise/bleed easily.   Psychiatric/Behavioral:  Negative for confusion. The patient is not nervous/anxious.    All other systems reviewed and are negative.      Past Medical History:   Diagnosis Date    Hypertension        Allergies   Allergen Reactions    Cat Hair  Extract Hives       Past Surgical History:   Procedure Laterality Date    ANKLE SURGERY      HERNIA REPAIR      TONSILLECTOMY         Family History   Problem Relation Age of Onset    Hypertension Mother     Diabetes Mother     Stroke Mother     Hypertension Father        Social History     Socioeconomic History    Marital status:    Tobacco Use    Smoking status: Never    Smokeless tobacco: Never   Substance and Sexual Activity    Alcohol use: Never    Drug use: Never           Objective   Physical Exam  Vitals and nursing note reviewed. Exam conducted with a chaperone present.   Constitutional:       General: He is not in acute distress.     Appearance: Normal appearance. He is well-developed. He is not toxic-appearing.   HENT:      Head: Normocephalic and atraumatic.      Nose: Nose normal.      Mouth/Throat:      Mouth: Mucous membranes are moist.      Pharynx: Uvula midline.   Eyes:      General: Lids are normal. Lids are everted, no foreign bodies appreciated.      Conjunctiva/sclera: Conjunctivae normal.      Pupils: Pupils are equal, round, and reactive to light.   Neck:      Vascular: Normal carotid pulses. No carotid bruit or JVD.      Trachea: Trachea and phonation normal. No tracheal deviation.   Cardiovascular:      Rate and Rhythm: Normal rate and regular rhythm.      Chest Wall: PMI is not displaced.      Pulses: Normal pulses.      Heart sounds: Normal heart sounds.      No systolic murmur is present.      No diastolic murmur is present.      No gallop.   Pulmonary:      Effort: Pulmonary effort is normal. No tachypnea, accessory muscle usage or respiratory distress.      Breath sounds: Normal breath sounds. No stridor. No decreased breath sounds, wheezing, rhonchi or rales.   Abdominal:      General: Bowel sounds are normal. There is no distension.      Palpations: Abdomen is soft.      Tenderness: There is no abdominal tenderness.   Musculoskeletal:         General: No swelling. Normal  range of motion.      Cervical back: Full passive range of motion without pain, normal range of motion and neck supple. No rigidity.      Comments: Lower extremity exam bilaterally is unremarkable.  There is no right or left calf tenderness .  There is no palpable venous cord.  No obvious difference in the size of the legs.  No pitting edema.  The dorsalis pedis and posterior tibial femoral and popliteal pulses are palpable and +2 bilaterally.  Homans sign is negative   Skin:     General: Skin is warm and dry.      Capillary Refill: Capillary refill takes less than 2 seconds.      Coloration: Skin is not jaundiced or pale.      Nails: There is no clubbing.   Neurological:      General: No focal deficit present.      Mental Status: He is alert and oriented to person, place, and time.      GCS: GCS eye subscore is 4. GCS verbal subscore is 5. GCS motor subscore is 6.      Cranial Nerves: No cranial nerve deficit.      Motor: Motor function is intact.      Gait: Gait normal.      Deep Tendon Reflexes: Reflexes are normal and symmetric. Reflexes normal.   Psychiatric:         Speech: Speech normal.         Behavior: Behavior normal.         Procedures           ED Course  ED Course as of 05/30/24 1104   Thu May 30, 2024   1003 Not having chest pain at this time [TS]   1037 Since cardiac markers elevated EKG does not show any evidence of acute STEMI BNP is elevated also and his blood pressure is up CT of the chest is negative for PE or aortic dissection [TS]      ED Course User Index  [TS] Guillermo Grossman MD                                             Medical Decision Making  Differential Diagnosis:  I considered chest wall pain, muscle strain, costochondritis, pleurisy, rib fracture, herpes zoster, cardiovascular etiology, myocardial infarction, intermediate coronary syndrome, unstable angina, angina, aortic dissection, pericarditis, pulmonary etiology, pulmonary embolism, pneumonia, pneumothorax, lung cancer,  gastroesophageal reflux disease, esophagitis, esophageal spasm and gastrointestinal etiology as a possible cause of chest pain in this patient. This is a partial list of diagnoses considered.        Problems Addressed:  Hypertensive emergency: complicated acute illness or injury  Non-STEMI (non-ST elevated myocardial infarction): complicated acute illness or injury  Pulmonary nodule: undiagnosed new problem with uncertain prognosis     Details: Require outpatient management patient informed    Amount and/or Complexity of Data Reviewed  Labs: ordered.     Details: Labs reviewed  Radiology: ordered.     Details: CT scan reviewed  ECG/medicine tests: ordered and independent interpretation performed.     Details: Normal sinus rhythm nonspecific ST-T wave changes  Discussion of management or test interpretation with external provider(s): Discussed with the cardiology Dr. Abrams the patient can go to the Cath Lab discussed with intensivist the patient will be admitted to ICU for blood pressure control.    Risk  OTC drugs.  Prescription drug management.  Decision regarding hospitalization.  Risk Details: Heart score 4  Wells score 0  Years Algorithm for Pulmonary Embolus  To be used in hemodynamically stable patient >18 years old    No signs of DVT are present, there is no hemoptysis, PE is not the most likely diagnosis and the D-dimer is not greater or equal to 1000 ng/mL. Therefore PE is excluded . The Years algorithm rules out PE (0.43 % with symptomatic VTE during 3-month follow-up)    Patient will be admitted to ICU blood pressure control and is going to Cath Lab.        Final diagnoses:   Hypertensive emergency   Non-STEMI (non-ST elevated myocardial infarction)   Pulmonary nodule       ED Disposition  ED Disposition       ED Disposition   Decision to Admit    Condition   --    Comment   Level of Care: Critical Care [6]   Diagnosis: Hypertensive emergency [163652]   Admitting Physician: MANUEL HOLT [[id number]]    Attending Physician: MANUEL HOLT [[id number]]   Certification: I Certify That Inpatient Hospital Services Are Medically Necessary For Greater Than 2 Midnights                 No follow-up provider specified.       Medication List      No changes were made to your prescriptions during this visit.            Guillermo Grossman MD  05/30/24 0934       Guillermo Grossman MD  05/30/24 1045       Guillermo Grossman MD  05/30/24 1109

## 2024-05-30 NOTE — CONSULTS
Consults    Consult from: Dr. Guillermo Grossman MD  Reason for Consultation: Elevated troponin    Chief Complaint: Elevated blood pressure, chest pain    HPI: This is a 54-year-old male with no personal history of cardiovascular disease but with a history of primary hypertension, tobacco use who presents with elevated blood pressure and also after 2 episodes of chest discomfort.  The patient notes that he began to experience substernal chest comfort yesterday morning.  He says this felt like a tightness and sharp pain across the chest and in the shoulders.  This lasted for few hours then resolved however returned about midnight.  Both episodes were unprovoked.  The second episode also lasted about 2 hours and subsequently resolved.  The patient noticed that his blood pressure was elevated.  He denies having headaches, visual changes, focal neurologic symptoms.  He describes stable breathing.  He denies having palpitations, lightheadedness, dizziness or syncope.  He presented to the emergency department for further evaluation chest pain-free but was found to have significant elevation of cardiac enzymes without acute EKG changes.    He does note a medical history of hypertension and a family history of coronary artery disease in his mother and also a stroke in his mother.  The patient is a smoker and drinks 3-4 beers per day.  He denies any illicit drug use.    Past Medical History:   Diagnosis Date    Hypertension      Past Surgical History:   Procedure Laterality Date    ANKLE SURGERY      HERNIA REPAIR      TONSILLECTOMY       Prior to Admission medications    Medication Sig Start Date End Date Taking? Authorizing Provider   amLODIPine-benazepril (LOTREL) 10-40 MG per capsule Take 1 capsule by mouth Daily.    Robert Cortes MD   aspirin 81 MG EC tablet Take 2 tablets every day by oral route.    ProviderRobert MD   nebivolol (BYSTOLIC) 20 MG tablet Take 1 tablet by mouth Daily.    Robert Cortes MD  "  sertraline (ZOLOFT) 100 MG tablet TAKE 1 AND 1/2 TABLETS DAILY BY MOUTH    Provider, MD Robert     Allergies   Allergen Reactions    Cat Hair Extract Hives     Social History     Tobacco Use    Smoking status: Every Day     Types: Cigars    Smokeless tobacco: Never   Substance Use Topics    Alcohol use: Not Currently     Comment: 3-4 beers per day     Family History   Problem Relation Age of Onset    Hypertension Mother     Diabetes Mother     Stroke Mother     Coronary artery disease Mother     Hypertension Father      Review of Systems: All pertinent negative and positives as noted above.  Otherwise, all systems reviewed and found to be negative.    Physical Exam:    BP (!) 152/111   Pulse 74   Temp 98.8 °F (37.1 °C) (Oral)   Resp 20   Ht 152.4 cm (60\")   Wt 90.7 kg (200 lb)   SpO2 97%   BMI 39.06 kg/m²   Temp:  [98.8 °F (37.1 °C)] 98.8 °F (37.1 °C)  Heart Rate:  [73-74] 74  Resp:  [20] 20  BP: (152-170)/(111-114) 152/111    Gen.: Awake alert and oriented ×3 and in no acute distress  HEENT: Normocephalic, atraumatic, pupils equally round and reactive to light, oropharynx clear, mucous membranes moist  Neck: Supple, no elevation of JVP, no thyromegaly, no carotid bruits  CV: Regular rate and rhythm, no audible murmurs, rubs, gallops  Pulmonary: Clear to auscultation bilaterally, no wheezes, rhonchi or rales  GI: Soft, nontender, nondistended, active bowel sounds  Extremities: Warm and well-perfused, no dermatitis or ulceration, no clubbing, cyanosis, edema  Neurologic: Cranial nerves are grossly intact, patient moves all 4 extremities equally during examination    Diagnostic Data:    Lab Results   Component Value Date    WBC 8.16 05/30/2024    HGB 16.8 05/30/2024    HCT 55.2 (H) 05/30/2024    MCV 81.5 05/30/2024     05/30/2024     Lab Results   Component Value Date    GLUCOSE 132 (H) 05/30/2024    CALCIUM 8.9 05/30/2024     05/30/2024    K 4.6 05/30/2024    CO2 27.0 05/30/2024     " 05/30/2024    BUN 4 (L) 05/30/2024    CREATININE 0.69 (L) 05/30/2024    EGFR 108.6 05/30/2024    BCR 5.8 (L) 05/30/2024    ANIONGAP 9.0 05/30/2024     High-sensitivity troponin: 294  proBNP: 1863    I personally viewed the ECG tracing from 9:27 AM: This shows normal sinus rhythm with a rate of 69 bpm, left axis deviation, T wave inversions noted in leads V4 through V6    Urine drug screen: Negative    CTA chest:  1. No evidence of aortic aneurysm or dissection.  2. No evidence of pulmonary embolism.  3. An 11 mm nodule in the superior segment of the left lower lobe. The  possibility of neoplasm may not be excluded. Further evaluation by a  follow-up CT scan of the chest in 3 months and/or PET/CT imaging may be  obtained.    ASSESSMENT/PLAN:    1. NSTEMI  2. Hypertensive urgency with baseline primary hypertension  3. Tobacco abuse  4.  Polycythemia  5.  Pulmonary nodule  6.  Elevated BNP  7.  At least moderate alcohol use    Given this patient's significant troponin elevation, elevated BNP, multiple episodes of chest discomfort, there is concern for ischemic heart disease.  Therefore, I recommended coronary angiography.  I have explained the procedural risks, benefits and alternatives the patient.  He is agreeable to proceed.    He will be admitted to the intensivist service for control of blood pressure.  We will certainly address any issues with coronary artery disease, heart failure, etc. that might be identified.    Further recommendations from cardiology will be pending the results of this patient's coronary angiography today.

## 2024-05-30 NOTE — Clinical Note
catheter removed  over the wire. Alar Island Pedicle Flap Text: The defect edges were debeveled with a #15 scalpel blade.  Given the location of the defect, shape of the defect and the proximity to the alar rim an island pedicle advancement flap was deemed most appropriate.  Using a sterile surgical marker, an appropriate advancement flap was drawn incorporating the defect, outlining the appropriate donor tissue and placing the expected incisions within the nasal ala running parallel to the alar rim. The area thus outlined was incised with a #15 scalpel blade.  The skin margins were undermined minimally to an appropriate distance in all directions around the primary defect and laterally outward around the island pedicle utilizing iris scissors.  There was minimal undermining beneath the pedicle flap.

## 2024-05-31 ENCOUNTER — APPOINTMENT (OUTPATIENT)
Dept: CARDIOLOGY | Facility: HOSPITAL | Age: 54
DRG: 280 | End: 2024-05-31
Payer: OTHER GOVERNMENT

## 2024-05-31 DIAGNOSIS — I21.4 NON-STEMI (NON-ST ELEVATED MYOCARDIAL INFARCTION): Primary | ICD-10-CM

## 2024-05-31 LAB
ANION GAP SERPL CALCULATED.3IONS-SCNC: 10 MMOL/L (ref 5–15)
BH CV ECHO MEAS - AO MAX PG: 7.8 MMHG
BH CV ECHO MEAS - AO MEAN PG: 4 MMHG
BH CV ECHO MEAS - AO ROOT DIAM: 2.7 CM
BH CV ECHO MEAS - AO V2 MAX: 140 CM/SEC
BH CV ECHO MEAS - AO V2 VTI: 25.8 CM
BH CV ECHO MEAS - AVA(I,D): 2.6 CM2
BH CV ECHO MEAS - EDV(CUBED): 125 ML
BH CV ECHO MEAS - EDV(MOD-SP2): 87.6 ML
BH CV ECHO MEAS - EDV(MOD-SP4): 92.6 ML
BH CV ECHO MEAS - EF(MOD-BP): 62.7 %
BH CV ECHO MEAS - EF(MOD-SP2): 63.1 %
BH CV ECHO MEAS - EF(MOD-SP4): 60.8 %
BH CV ECHO MEAS - ESV(CUBED): 39.3 ML
BH CV ECHO MEAS - ESV(MOD-SP2): 32.3 ML
BH CV ECHO MEAS - ESV(MOD-SP4): 36.3 ML
BH CV ECHO MEAS - FS: 32 %
BH CV ECHO MEAS - IVS/LVPW: 1.09 CM
BH CV ECHO MEAS - IVSD: 1.2 CM
BH CV ECHO MEAS - LA DIMENSION: 4 CM
BH CV ECHO MEAS - LAT PEAK E' VEL: 6.5 CM/SEC
BH CV ECHO MEAS - LV DIASTOLIC VOL/BSA (35-75): 47.5 CM2
BH CV ECHO MEAS - LV MASS(C)D: 220.3 GRAMS
BH CV ECHO MEAS - LV MAX PG: 4.1 MMHG
BH CV ECHO MEAS - LV MEAN PG: 2 MMHG
BH CV ECHO MEAS - LV SYSTOLIC VOL/BSA (12-30): 18.6 CM2
BH CV ECHO MEAS - LV V1 MAX: 101 CM/SEC
BH CV ECHO MEAS - LV V1 VTI: 19.5 CM
BH CV ECHO MEAS - LVIDD: 5 CM
BH CV ECHO MEAS - LVIDS: 3.4 CM
BH CV ECHO MEAS - LVOT AREA: 3.5 CM2
BH CV ECHO MEAS - LVOT DIAM: 2.1 CM
BH CV ECHO MEAS - LVPWD: 1.1 CM
BH CV ECHO MEAS - MED PEAK E' VEL: 5 CM/SEC
BH CV ECHO MEAS - MV A MAX VEL: 73.3 CM/SEC
BH CV ECHO MEAS - MV DEC TIME: 0.26 SEC
BH CV ECHO MEAS - MV E MAX VEL: 44.6 CM/SEC
BH CV ECHO MEAS - MV E/A: 0.61
BH CV ECHO MEAS - SV(LVOT): 67.5 ML
BH CV ECHO MEAS - SV(MOD-SP2): 55.3 ML
BH CV ECHO MEAS - SV(MOD-SP4): 56.3 ML
BH CV ECHO MEAS - SVI(LVOT): 34.7 ML/M2
BH CV ECHO MEAS - SVI(MOD-SP2): 28.4 ML/M2
BH CV ECHO MEAS - SVI(MOD-SP4): 28.9 ML/M2
BH CV ECHO MEASUREMENTS AVERAGE E/E' RATIO: 7.76
BH CV XLRA - RV BASE: 2.5 CM
BH CV XLRA - RV LENGTH: 6.4 CM
BH CV XLRA - RV MID: 2.7 CM
BUN SERPL-MCNC: 10 MG/DL (ref 6–20)
BUN/CREAT SERPL: 11.1 (ref 7–25)
CALCIUM SPEC-SCNC: 9 MG/DL (ref 8.6–10.5)
CHLORIDE SERPL-SCNC: 100 MMOL/L (ref 98–107)
CHOLEST SERPL-MCNC: 123 MG/DL (ref 0–200)
CO2 SERPL-SCNC: 26 MMOL/L (ref 22–29)
CREAT SERPL-MCNC: 0.9 MG/DL (ref 0.76–1.27)
DEPRECATED RDW RBC AUTO: 48.6 FL (ref 37–54)
EGFRCR SERPLBLD CKD-EPI 2021: 100.2 ML/MIN/1.73
ERYTHROCYTE [DISTWIDTH] IN BLOOD BY AUTOMATED COUNT: 18.3 % (ref 12.3–15.4)
GLUCOSE SERPL-MCNC: 161 MG/DL (ref 65–99)
HBA1C MFR BLD: 6.9 % (ref 4.8–5.6)
HCT VFR BLD AUTO: 52.2 % (ref 37.5–51)
HDLC SERPL-MCNC: 30 MG/DL (ref 40–60)
HGB BLD-MCNC: 16 G/DL (ref 13–17.7)
LDLC SERPL CALC-MCNC: 60 MG/DL (ref 0–100)
LDLC/HDLC SERPL: 1.78 {RATIO}
LEFT ATRIUM VOLUME INDEX: 15.3 ML/M2
LEFT ATRIUM VOLUME: 29.8 ML
MCH RBC QN AUTO: 25 PG (ref 26.6–33)
MCHC RBC AUTO-ENTMCNC: 30.7 G/DL (ref 31.5–35.7)
MCV RBC AUTO: 81.4 FL (ref 79–97)
PLATELET # BLD AUTO: 252 10*3/MM3 (ref 140–450)
PMV BLD AUTO: 10.2 FL (ref 6–12)
POTASSIUM SERPL-SCNC: 3.8 MMOL/L (ref 3.5–5.2)
QT INTERVAL: 444 MS
QTC INTERVAL: 475 MS
RBC # BLD AUTO: 6.41 10*6/MM3 (ref 4.14–5.8)
SODIUM SERPL-SCNC: 136 MMOL/L (ref 136–145)
TRIGL SERPL-MCNC: 198 MG/DL (ref 0–150)
VLDLC SERPL-MCNC: 33 MG/DL (ref 5–40)
WBC NRBC COR # BLD AUTO: 8.76 10*3/MM3 (ref 3.4–10.8)

## 2024-05-31 PROCEDURE — 85027 COMPLETE CBC AUTOMATED: CPT | Performed by: INTERNAL MEDICINE

## 2024-05-31 PROCEDURE — 25010000002 ENOXAPARIN PER 10 MG: Performed by: INTERNAL MEDICINE

## 2024-05-31 PROCEDURE — 25010000002 FUROSEMIDE PER 20 MG: Performed by: INTERNAL MEDICINE

## 2024-05-31 PROCEDURE — 25010000002 HYDRALAZINE PER 20 MG: Performed by: INTERNAL MEDICINE

## 2024-05-31 PROCEDURE — 80048 BASIC METABOLIC PNL TOTAL CA: CPT | Performed by: INTERNAL MEDICINE

## 2024-05-31 PROCEDURE — 99232 SBSQ HOSP IP/OBS MODERATE 35: CPT | Performed by: INTERNAL MEDICINE

## 2024-05-31 PROCEDURE — 83036 HEMOGLOBIN GLYCOSYLATED A1C: CPT | Performed by: INTERNAL MEDICINE

## 2024-05-31 PROCEDURE — 93306 TTE W/DOPPLER COMPLETE: CPT | Performed by: INTERNAL MEDICINE

## 2024-05-31 PROCEDURE — 80061 LIPID PANEL: CPT | Performed by: INTERNAL MEDICINE

## 2024-05-31 PROCEDURE — 93306 TTE W/DOPPLER COMPLETE: CPT

## 2024-05-31 RX ORDER — CARVEDILOL 6.25 MG/1
12.5 TABLET ORAL 2 TIMES DAILY WITH MEALS
Status: DISCONTINUED | OUTPATIENT
Start: 2024-05-31 | End: 2024-06-02 | Stop reason: HOSPADM

## 2024-05-31 RX ORDER — ATORVASTATIN CALCIUM 40 MG/1
40 TABLET, FILM COATED ORAL NIGHTLY
Status: DISCONTINUED | OUTPATIENT
Start: 2024-05-31 | End: 2024-06-02 | Stop reason: HOSPADM

## 2024-05-31 RX ADMIN — ATORVASTATIN CALCIUM 40 MG: 40 TABLET ORAL at 21:13

## 2024-05-31 RX ADMIN — SERTRALINE 150 MG: 50 TABLET, FILM COATED ORAL at 08:44

## 2024-05-31 RX ADMIN — CARVEDILOL 12.5 MG: 6.25 TABLET, FILM COATED ORAL at 10:45

## 2024-05-31 RX ADMIN — ENOXAPARIN SODIUM 80 MG: 100 INJECTION SUBCUTANEOUS at 06:01

## 2024-05-31 RX ADMIN — AMLODIPINE BESYLATE 10 MG: 10 TABLET ORAL at 08:43

## 2024-05-31 RX ADMIN — HYDRALAZINE HYDROCHLORIDE 10 MG: 20 INJECTION, SOLUTION INTRAMUSCULAR; INTRAVENOUS at 03:43

## 2024-05-31 RX ADMIN — ENOXAPARIN SODIUM 80 MG: 100 INJECTION SUBCUTANEOUS at 18:12

## 2024-05-31 RX ADMIN — CARVEDILOL 12.5 MG: 6.25 TABLET, FILM COATED ORAL at 18:12

## 2024-05-31 RX ADMIN — Medication 1 APPLICATION: at 08:45

## 2024-05-31 RX ADMIN — CLOPIDOGREL BISULFATE 75 MG: 75 TABLET, FILM COATED ORAL at 08:43

## 2024-05-31 RX ADMIN — ASPIRIN 81 MG: 81 TABLET, COATED ORAL at 08:43

## 2024-05-31 RX ADMIN — FUROSEMIDE 40 MG: 10 INJECTION, SOLUTION INTRAMUSCULAR; INTRAVENOUS at 02:00

## 2024-05-31 RX ADMIN — Medication 1 APPLICATION: at 21:13

## 2024-05-31 RX ADMIN — LISINOPRIL 40 MG: 20 TABLET ORAL at 08:44

## 2024-05-31 RX ADMIN — FUROSEMIDE 40 MG: 10 INJECTION, SOLUTION INTRAMUSCULAR; INTRAVENOUS at 15:45

## 2024-05-31 NOTE — PROGRESS NOTES
Continuity note:    Informed the emergency room of Mr. Smart's admission for NSTEMI.  He has been admitted to the intensivist service and cardiac catheterization performed yesterday by cardiology.  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  He has had very difficult to control blood pressure, made more difficult to control due to spotty compliance with follow-up appointments.  I last saw him September last year.  All events of current hospitalization have been reviewed and noted.  I will follow along peripherally with anticipation of transfer to the floor once he is medically stable.  Please feel free to call with any questions or concerns.    Electronically signed by Daniel Mckeon MD, 05/31/24, 7:38 AM CDT.

## 2024-05-31 NOTE — PROGRESS NOTES
[facility]  INPATIENT PROGRESS NOTE    Patient Name: Lilia Smart  Date of Admission: 5/30/2024  Today's Date: 05/31/24  Length of Stay: 1  Primary Care Physician: Daniel Mckeon MD    Subjective   Chief Complaint: NSTEMI 2, hypertensive emergency   HPI   55 y/o gentleman with pmh of HTN, Polycythemia, tobacco use coming in with with one day of intermittent chest pain, substernal, sharp and radiating to shoulders. Lasted for about 2 hours with recurrent episode.   He presented to the ED this morning and was found to have elevated blood pressure. He had stable EKG but elevated cardiac enzymes and BNP for which he was taken to cath lab. 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   Patient denies having similar episodes in past.  He reports his blood pressure is always high and he has been tried on different medications without adequate control.   Current tobacco user and alcohol use.   Patient was diagnosed a blood disorder ?polycythemia 2 years ago and gives blood every few months for the same.   Compliant w blood pressure medications. Reports he took them all this morning.        5/31  Cardene drip is off. Blood pressure improved.     Hospital course  LHC w dissection, blood pressure improving with diuresis. Changed nebivilol to coreg  He is to be transferred to floor.        Review of Systems   Otherwise complete ROS reviewed and negative except as mentioned in the HPI.      Review of Systems   All pertinent negatives and positives are as above. All other systems have been reviewed and are negative unless otherwise stated.     Objective    Temp:  [98.3 °F (36.8 °C)-98.7 °F (37.1 °C)] 98.3 °F (36.8 °C)  Heart Rate:  [65-84] 75  Resp:  [16-20] 16  BP: ()/() 143/99  Physical Exam  Physical Exam   GEN: nad,  Heent; eomi, mmm  Rs: ctab  Cvs: rrr, no mrg  Pa: soft, nt, nd, no hsm  Ext: no  "c/c/e      Results Review:  XR Chest 1 View    Result Date: 5/30/2024  Shallow inspiration, no acute cardiopulmonary abnormality.   This report was signed and finalized on 5/30/2024 10:57 AM by Dr. Taye Magallon MD.      CT Angiogram Chest    Result Date: 5/30/2024  1. No evidence of aortic aneurysm or dissection. 2. No evidence of pulmonary embolism. 3. An 11 mm nodule in the superior segment of the left lower lobe. The possibility of neoplasm may not be excluded. Further evaluation by a follow-up CT scan of the chest in 3 months and/or PET/CT imaging may be obtained.            This report was signed and finalized on 5/30/2024 10:34 AM by Dr. Myranda Mccartney MD.       Result Review:  I have personally reviewed the results from the time of this admission to 5/31/2024 12:41 CDT and agree with these findings:  [x]  Laboratory list / accordion  [x]  Microbiology  [x]  Radiology  []  EKG/Telemetry   []  Cardiology/Vascular   []  Pathology  []  Old records  []  Other:  Most notable findings include:     Culture Data:   No results found for: \"BLOODCX\", \"URINECX\", \"WOUNDCX\", \"MRSACX\", \"RESPCX\", \"STOOLCX\"    I have reviewed the patient's current medications.     Assessment/Plan   Assessment  Active Hospital Problems    Diagnosis     **Non-STEMI (non-ST elevated myocardial infarction)     NSTEMI, initial episode of care     Hypertensive emergency       Hypertensive emergency  --aggressive blood pressure lowering per cardiology recommendation  --amlodipine 10mg daily  --ct lisinopril 40 daily  --swapped nebivilol to coreg 12.5 bid  --d/c cardene drip  --lasix 40 iv bid for one more day, can swap to PO lasix from tomorrow     NSTEMI  --aspirin 81 daily  --plavix 75 daily, duration per cardiology  --atorvastatin 40mg nightly  --therapeutic lovenox per cardiology     ETOH use  --monitor for w/d     Polycythemia: could be secondary from nicotine use and undiagnosed VIC.  --monitor   --outpatient pulmonary follow up for sleep " apnea     Lung nodule; 11mm, incidental finding, given active smoking needs work up for lung nodule.   --outpatient pulmonary follow up.   --patient has been alerted about the incidental finding and asked to seek pulmonary care.        Medical Decision Making  Number and Complexity of problems: 5  Differential Diagnosis: as above     Conditions and Status        Condition is improving.     Transfer to floor  MDM Data  External documents reviewed: no  Cardiac tracing (EKG, telemetry) interpretation: yes  Radiology interpretation: yes  Labs reviewed: yes  Any tests that were considered but not ordered: no        Discussed with: patient and sister     Care Planning  Shared decision making: yes  Code status and discussions: full     Disposition  Social Determinants of Health that impact treatment or disposition: -  Estimated length of stay is 2 days      Electronically signed by Steve Horton MD on 5/31/2024 at 12:41 CDT

## 2024-05-31 NOTE — PAYOR COMM NOTE
"REF:    [id number]    [facility]  [place],   [phone]  OR   FAX    [phone]    Lilia Jaeger (54 y.o. Male)       Date of Birth   1970    Social Security Number       Address   [address] [place] [address]    Home Phone   [phone]    MRN   [id number]       Scientologist   Yarsanism    Marital Status                               Admission Date   5/30/24    Admission Type   Emergency    Admitting Provider   Steve Horton MD    Attending Provider   Steve Horton MD    Department, Room/Bed   [facility] CARDIAC CARE, C003/1       Discharge Date       Discharge Disposition       Discharge Destination                                 Attending Provider: Steve Horton MD    Allergies: Cat Hair Extract    Isolation: None   Infection: None   Code Status: CPR    Ht: 152.4 cm (60\")   Wt: 77.2 kg (170 lb 4.8 oz)    Admission Cmt: None   Principal Problem: Non-STEMI (non-ST elevated myocardial infarction) [I21.4]                   Active Insurance as of 5/30/2024       Primary Coverage       Payor Plan Insurance Group Employer/Plan Group    [facility] Agent Partner [facility] Agent Partner        Payor Plan Address Payor Plan Phone Number Payor Plan Fax Number Effective Dates    [address] [address]   5/28/2024 - None Entered    [facility] [id number]         Subscriber Name Subscriber Birth Date Member ID       LILIA JAEGER 1970 [id number]                     Emergency Contacts        (Rel.) Home Phone Work Phone Mobile Phone    JOHANA ORTIZ (Sister) -- -- [phone]          09:21:58 Chief Complaints Updated Hypertension Megan Sarabia RN   09:22 Vitals Assessment  Megan Sarabia RN   09:22 Allergies Reviewed  Megan Sarabia RN   09:22 Suicide Risk C-SSRS (Recent)  Q1 Wished to be Dead (Past Month): no  Q2 Suicidal Thoughts (Past Month): no  Q6 Suicide Behavior (Lifetime): no Megan Sarabia RN   09:22 Vital Signs  Vital " "Signs  Temp: 98.8 °F (37.1 °C)  Temp src: Oral  Heart Rate: 73  Resp: 20  BP: 170/114 Abnormal   BMI (Calculated): 39.1  Oxygen Therapy  SpO2: 98 %  Vitals Timer  Restart Vitals Timer: Yes  Height and Weight  Height: 152.4 cm (60\")  Weight: 90.7 kg (200 lb)  Other flowsheet entries  Ideal Body Weight k Megan Sarabia RN     9:28:09 ECG 12 Lead Chest Pain Resulted Collected: 2024 09:27  Last updated: 2024 09:28  Status: Preliminary result  QT Interval: 444 ms  QTC Interval: 475 ms Interface, Ekg Results In     09:35 Pain Medication Administered - Adult Given - aspirin chewable tablet 324 mg Imer Pham RN   09:35 Medication Given aspirin chewable tablet 324 mg - Dose: 324 mg ; Route: Oral ; Scheduled Time: 940 Imer Pham RN   09:35:01 Orders Acknowledged Discontinued  - [place] Draw Imer Pham RN   09:35:11 ED Note Filed ED Prov Note filed by Guillermo Grossman MD Sayyad, Tariq, MD   09:35:11 ED Provider Notes Note originally filed at this time Guillermo Grossman MD   09:35:12 In Process Status Selected  Imer Pham RN   09:37 Medication Given labetalol (NORMODYNE,TRANDATE) injection 20 mg - Dose: 20 mg ; Route: Intravenous ; Line: Peripheral IV 24 Anterior;Distal;Left;Upper Arm ; Scheduled Time: 948 Imer Pham RN     10:15:50 Cardiac Cardiac (Adult)  Cardiac WDL: WDL  Additional Documentation: ECG (Group)  ECG  Lead Monitored: Lead II  Rhythm: normal sinus rhythm Imer Pham RN     10:31 Vital Signs  Vital Signs  Heart Rate: 74 (Device Time: 10::00)  BP: 152/111 Abnormal  (Device Time: 10::00)  Noninvasive MAP (mmHg): 121 (Device Time: 10::00)  Oxygen Therapy  SpO2: 97 % (Device Time: 10::00) Imer Pham RN     10:37 Free Text Since cardiac markers elevated EKG does not show any evidence of acute STEMI BNP is elevated also and his blood pressure is up CT of the chest is negative for PE or aortic dissection Guillermo Grossman MD     0:50 Medication New Bag " niCARdipine (CARDENE) 25 mg in 250 mL NS infusion kit - Dose: 5 mg/hr ; Rate: 50 mL/hr ; Route: Intravenous ; Line: Peripheral IV 05/30/24 Anterior;Distal;Left;Upper Arm ; Scheduled Time: 1037 Imer Pham RN   10:51 Medication Currently Infusing niCARdipine (CARDENE) 25 mg in 250 mL NS infusion kit - Dose: 5 mg/hr ; Rate: 50 mL/hr ; Route: Intravenous ; Line: Peripheral IV 05/30/24 Anterior;Distal;Left;Upper Arm ; Scheduled Time: 1051 Imer Pham RN     11:02 HPI HPI (Adult)  Stated Reason for Visit: patient states that he started having chest pain last night that was midsternal and went between his shoulders that stopped around 0230 this AM. Paitent also states that he has had N/V and weakness.  History Obtained From: patient Imer Pham RN     ndications    Non-STEMI (non-ST elevated myocardial infarction) [I21.4 (ICD-10-CM)]         Conclusion    Date of Procedure: 05/30/24     Procedures Performed:     1. Selective coronary angiography  2. Left heart catheterization  3. Left ventriculography  4. Administration and monitoring of conscious sedation during the procedure     Indication: NSTEMI     Risk, Benefits, and Alternatives discussed with the patient and/or family.  Plan is for moderate sedation, and the patient agrees to proceed with the procedure.  An immediate assessment was done prior to the administration of moderate sedation.     Access: 6 Hungarian right femoral artery, Perclose  Diagnostic Catheters: 6 Hungarian JL 4, JR4     Procedural Details: After written and informed consent was obtained, the patient was brought to the cardiac catheterization lab in a fasting state. The skin overlying the patient's right groin was prepped and draped in the usual sterile fashion. Timeout was taken to confirm the correct patient and procedure. IV Versed and fentanyl were used to achieve conscious sedation. Lidocaine was administered for local anesthesia over the right femoral artery.  Modified Seldinger  technique was then used to place a 6 English sheath in the right femoral artery. Diagnostic coronary angiography was then performed with 6 English JL4 and JR4 diagnostic coronary catheters. Multiple hand injected angiograms were performed.  A 6 English angled pigtail catheter was then used across the aortic valve in a retrograde fashion.  Ventriculography was performed in single DENT projection.  Pullback gradient was assessed across the aortic valve. Upon completion of all procedures, a femoral angiogram and was performed and revealed arteriotomy site suitable for a closure device. A Perclose was used to achieve hemostasis. The patient tolerated the procedures well. There were no immediate complications.  During the procedure, I supervised the administration and monitoring of conscious sedation.  This included monitoring of the patient's status, oximetry, hemodynamics.  The patient received the first dose of IV sedation at 11:19 AM and I left the room at 11:39 AM, accounting for a total of 20 minutes of face-to-face time with the patient in the cath lab today.     Results:     Selective Coronary Angiography:     Left Main Coronary Artery: The left main coronary artery arises from the left coronary cusp and appears to be likely a very short vessel that immediately bifurcates into the LAD and left circumflex arteries.  There is no appreciable disease in the left main coronary artery.     Left Anterior Descending Artery: The left anterior descending artery arises from the distal left main coronary artery and supplies 2 diagonal branches and terminates as a recurrent apical branch.  The LAD is mildly calcified in the proximal segment of the vessel.  The first diagonal branch is a very proximal branch that appears to have mild irregularities..  The second diagonal branch has at least a moderate stenosis in its proximal segment.  The midportion of the LAD, near the takeoff of the second diagonal branch does have a mild  degree of disease.  The distal vessel is with luminal irregularities.     Left Circumflex: The left circumflex artery arises from the distal left main artery and supplies a first obtuse marginal branch that appears to have a dissection flap in the midportion of this vessel extending towards a terminal trifurcation of this branch.  This does appear to be a dissected vessel, likely due to uncontrolled hypertension.  There is mild atherosclerotic disease but no angiographically severe lesions, however.  Distally, the circumflex then provides several PL type branches and a posterior descending artery, this is dominant for the posterior circulation.     Right Coronary Artery: The right coronary artery arises from the right coronary cusp and is a small caliber nondominant vessel.     Left Heart Catheterization:   - Left ventricular pressure: 150/30-35 mmHg  - Aortic pressure: 150/105 mmHg     Left Ventriculography: The left ventricle appears to be normal in size.  There is what appears to be mild apical lateral hypokinesis, otherwise with ejection fraction estimated at greater than 55%.  No significant mitral valve regurgitation noted.     Total contrast: 90 mL to the patient     Fluoroscopy time: 2 min     X-ray exposure: 156 mGy     Estimated Blood Loss: Minimal     Specimens: None     Complications: None     Impression:  1.  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  Flow in this branch appears to be near normal with no obvious obstruction to flow at this time, therefore this dissection was left untreated as any attempts to treat the dissection might further propagate the defect and thus impair flow into the distal terminal branches.  2.  Moderate stenosis in a small caliber diagonal branch, otherwise mild disease in the LAD territory.  3.  Small caliber nondominant right coronary artery.  4.  Acute diastolic  heart failure secondary to uncontrolled hypertension with marked elevation in left ventricular end-diastolic pressure.  5.  Preserved left ventricular systolic function.     Plan:  1. Routine post procedure orders, including 4 hours bedrest, gentle IV fluids for the next few hours then discontinue.  2.  Given significant increase in left ventricular end-diastolic pressure, initiate Lasix 40 mg IV every 12 hours.  3.  Initiate aspirin therapy, clopidogrel for medical management of NSTEMI.  Also, recommend therapeutic Lovenox for at least 48 hours at 1 mg/kg subcutaneous every 12 hours.  4.  Check echocardiogram to formally evaluate systolic function as well as valvular function.  5.  Aggressive risk factor modification medical management of underlying coronary artery disease.  6.  Aggressive blood pressure control, currently on a Cardene drip, resume home medications and titrate and/or add medications if necessary.          Odette Luna, RN   Registered Nurse  Cardiology     Plan of Care      Signed     Date of Service: 05/31/24 0647  Creation Time: 05/31/24 0647     Signed         Goal Outcome Evaluation:  Plan of Care Reviewed With: patient  Progress: improving.  Patient is alert and oriented x4, and is eager to learn.                       History & Physical        Steve Horton MD at 05/30/24 1334              [facility]  HISTORY AND PHYSICAL    Date of Admission: 5/30/2024  Primary Care Physician: Daniel Mckeon MD    Subjective   Primary Historian: patient    Chief Complaint: NSTEMI 2, hypertensive emergency    History of Present Illness  55 y/o gentleman with pmh of HTN, Polycythemia, tobacco use coming in with with one day of intermittent chest pain, substernal, sharp and radiating to shoulders. Lasted for about 2 hours with recurrent episode.   He presented to the ED this morning and was found to have elevated blood pressure. He had stable EKG  but elevated cardiac enzymes and BNP for which he was taken to cath lab. 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   Patient denies having similar episodes in past.  He reports his blood pressure is always high and he has been tried on different medications without adequate control.   Current tobacco user and alcohol use.   Patient was diagnosed a blood disorder ?polycythemia 2 years ago and gives blood every few months for the same.   Compliant w blood pressure medications. Reports he took them all this morning.       Review of Systems   Otherwise complete ROS reviewed and negative except as mentioned in the HPI.    Past Medical History:   Past Medical History:   Diagnosis Date    Hypertension      Past Surgical History:  Past Surgical History:   Procedure Laterality Date    ANKLE SURGERY      HERNIA REPAIR      TONSILLECTOMY       Social History:  reports that he has been smoking cigars. He has never used smokeless tobacco. He reports that he does not currently use alcohol. He reports that he does not use drugs.    Family History: family history includes Coronary artery disease in his mother; Diabetes in his mother; Hypertension in his father and mother; Stroke in his mother.       Allergies:  Allergies   Allergen Reactions    Cat Hair Extract Hives       Medications:  Prior to Admission medications    Medication Sig Start Date End Date Taking? Authorizing Provider   amLODIPine-valsartan (EXFORGE)  MG per tablet Take 1 tablet by mouth Daily.   Yes ProviderRobert MD   aspirin 81 MG EC tablet Take 2 tablets by mouth Daily.   Yes Provider, MD Robert   nebivolol (BYSTOLIC) 20 MG tablet Take 1 tablet by mouth Daily.   Yes Provider, MD Robert   sertraline (ZOLOFT) 100 MG tablet Take 1.5 tablets by mouth Daily.   Yes ProviderRobert MD   amLODIPine-benazepril (LOTREL) 10-40 MG per capsule Take 1 capsule by mouth Daily.  5/30/24  Provider,  "MD Robert     I have utilized all available immediate resources to obtain, update, or review the patient's current medications (including all prescriptions, over-the-counter products, herbals, cannabis/cannabidiol products, and vitamin/mineral/dietary (nutritional) supplements).    Objective     Vital Signs: BP (!) 152/111   Pulse 74   Temp 98.8 °F (37.1 °C) (Oral)   Resp 20   Ht 152.4 cm (60\")   Wt 77.2 kg (170 lb 4.8 oz)   SpO2 97%   BMI 33.26 kg/m²   Physical Exam   GEN: nad,  Heent; eomi, mmm  Rs: ctab  Cvs: rrr, no mrg  Pa: soft, nt, nd, no hsm  Ext: no c/c/e      Results Reviewed:  XR Chest 1 View    Result Date: 5/30/2024  Shallow inspiration, no acute cardiopulmonary abnormality.   This report was signed and finalized on 5/30/2024 10:57 AM by Dr. Taye Magallon MD.      CT Angiogram Chest    Result Date: 5/30/2024  1. No evidence of aortic aneurysm or dissection. 2. No evidence of pulmonary embolism. 3. An 11 mm nodule in the superior segment of the left lower lobe. The possibility of neoplasm may not be excluded. Further evaluation by a follow-up CT scan of the chest in 3 months and/or PET/CT imaging may be obtained.            This report was signed and finalized on 5/30/2024 10:34 AM by Dr. Myranda Mccartney MD.       Result Review:  I have personally reviewed the results from the time of this admission to 5/30/2024 13:34 CDT and agree with these findings:  []  Laboratory list / accordion  []  Microbiology  []  Radiology  []  EKG/Telemetry   []  Cardiology/Vascular   []  Pathology  []  Old records  []  Other:  Most notable findings include:     1.  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  Flow in this branch appears to be near normal with no obvious obstruction to flow at this time, therefore this dissection was left untreated as any attempts to treat the dissection might further " propagate the defect and thus impair flow into the distal terminal branches.  2.  Moderate stenosis in a small caliber diagonal branch, otherwise mild disease in the LAD territory.  3.  Small caliber nondominant right coronary artery.      I have personally reviewed and interpreted the radiology studies and ECG obtained at time of admission.     Assessment / Plan   Assessment:   Active Hospital Problems    Diagnosis     **Non-STEMI (non-ST elevated myocardial infarction)     NSTEMI, initial episode of care     Hypertensive emergency    Non obstructive CAD    Treatment Plan  Hypertensive emergency  --aggressive blood pressure lowering per cardiology recommendation  --resumed home amlo  --replaced valsartan with lisinopril  --resumed nebivolol, may swap to coreg tomorrow.  --continue on nicardipine drip  --cardiology has recommended lasix 40 iv bid  --added po hydral but cardene drip has been turned off, will reassess after pm dose of lasix. Can add hydral po back.     NSTEMI  --aspirin  --plavix  --statin  --therapeutic lovenox    ETOH use  --monitor for w/d    Polycythemia: could be secondary from nicotine use and undiagnosed VIC  --monitor   --outpatient pulmonary follow up for sleep apnea    Lung nodule; 11mm, incidental finding  --outpatient pulmonary follow up.         Medical Decision Making  Number and Complexity of problems: 5  Differential Diagnosis: as above    Conditions and Status        Condition is improving.     [facility] Data  External documents reviewed: no  Cardiac tracing (EKG, telemetry) interpretation: yes  Radiology interpretation: yes  Labs reviewed: yes  Any tests that were considered but not ordered: no       Discussed with: patient and sister     Care Planning  Shared decision making: yes  Code status and discussions: full    Disposition  Social Determinants of Health that impact treatment or disposition: -  Estimated length of stay is 2 days    I confirmed that the patient's advanced care plan is  present, code status is documented, and a surrogate decision maker is listed in the patient's medical record.     The patient's surrogate decision maker is unknown.     The patient was seen and examined by me on 5/30/24 at 1:30pm.      Electronically signed by Steve Horton MD on 5/30/2024 at 13:34 CDT               Electronically signed by Steve Horton MD at 05/30/24 9234          Emergency Department Notes        Guillermo Grossman MD at 05/30/24 0932          Subjective   History of Present Illness  Patient is a 44-year-old gentleman who had an episode of chest pain last night radiating to the neck and intrascapular area there is no history of heart disease but has history of high blood pressure and family history coronary disease with cigarette smoking.    Hypertension  Severity:  Moderate  Onset quality:  Gradual  Timing:  Constant  Progression:  Worsening  Chronicity:  Recurrent  Context: normal sodium, not caffeine, not drug abuse, not medication change and not stress    Relieved by:  Nothing  Worsened by:  Nothing  Ineffective treatments:  None tried  Associated symptoms: chest pain and nausea    Associated symptoms: no abdominal pain, no anxiety, no confusion, no dizziness, no ear pain, no fever, no headaches, no hematuria, no hypokalemia, no loss of consciousness, no neck pain, no peripheral edema, no syncope, not vomiting and no weakness    Risk factors: family hx of HTN    Risk factors: no alcohol use, no cocaine use, no kidney disease and no PVD        Review of Systems   Constitutional: Negative.  Negative for fever.   HENT: Negative.  Negative for ear pain.    Cardiovascular:  Positive for chest pain. Negative for syncope.   Gastrointestinal: Negative.  Positive for nausea. Negative for abdominal distention, abdominal pain and vomiting.   Endocrine: Negative.    Genitourinary: Negative.  Negative for hematuria.   Musculoskeletal: Negative.  Negative for back pain and neck pain.   Skin:  Negative for  color change and pallor.   Neurological: Negative.  Negative for dizziness, loss of consciousness, syncope, weakness, light-headedness, numbness and headaches.   Hematological: Negative.  Does not bruise/bleed easily.   Psychiatric/Behavioral:  Negative for confusion. The patient is not nervous/anxious.    All other systems reviewed and are negative.      Past Medical History:   Diagnosis Date    Hypertension        Allergies   Allergen Reactions    Cat Hair Extract Hives       Past Surgical History:   Procedure Laterality Date    ANKLE SURGERY      HERNIA REPAIR      TONSILLECTOMY         Family History   Problem Relation Age of Onset    Hypertension Mother     Diabetes Mother     Stroke Mother     Hypertension Father        Social History     Socioeconomic History    Marital status:    Tobacco Use    Smoking status: Never    Smokeless tobacco: Never   Substance and Sexual Activity    Alcohol use: Never    Drug use: Never           Objective   Physical Exam  Vitals and nursing note reviewed. Exam conducted with a chaperone present.   Constitutional:       General: He is not in acute distress.     Appearance: Normal appearance. He is well-developed. He is not toxic-appearing.   HENT:      Head: Normocephalic and atraumatic.      Nose: Nose normal.      Mouth/Throat:      Mouth: Mucous membranes are moist.      Pharynx: Uvula midline.   Eyes:      General: Lids are normal. Lids are everted, no foreign bodies appreciated.      Conjunctiva/sclera: Conjunctivae normal.      Pupils: Pupils are equal, round, and reactive to light.   Neck:      Vascular: Normal carotid pulses. No carotid bruit or JVD.      Trachea: Trachea and phonation normal. No tracheal deviation.   Cardiovascular:      Rate and Rhythm: Normal rate and regular rhythm.      Chest Wall: PMI is not displaced.      Pulses: Normal pulses.      Heart sounds: Normal heart sounds.      No systolic murmur is present.      No diastolic murmur is present.       No gallop.   Pulmonary:      Effort: Pulmonary effort is normal. No tachypnea, accessory muscle usage or respiratory distress.      Breath sounds: Normal breath sounds. No stridor. No decreased breath sounds, wheezing, rhonchi or rales.   Abdominal:      General: Bowel sounds are normal. There is no distension.      Palpations: Abdomen is soft.      Tenderness: There is no abdominal tenderness.   Musculoskeletal:         General: No swelling. Normal range of motion.      Cervical back: Full passive range of motion without pain, normal range of motion and neck supple. No rigidity.      Comments: Lower extremity exam bilaterally is unremarkable.  There is no right or left calf tenderness .  There is no palpable venous cord.  No obvious difference in the size of the legs.  No pitting edema.  The dorsalis pedis and posterior tibial femoral and popliteal pulses are palpable and +2 bilaterally.  Homans sign is negative   Skin:     General: Skin is warm and dry.      Capillary Refill: Capillary refill takes less than 2 seconds.      Coloration: Skin is not jaundiced or pale.      Nails: There is no clubbing.   Neurological:      General: No focal deficit present.      Mental Status: He is alert and oriented to person, place, and time.      GCS: GCS eye subscore is 4. GCS verbal subscore is 5. GCS motor subscore is 6.      Cranial Nerves: No cranial nerve deficit.      Motor: Motor function is intact.      Gait: Gait normal.      Deep Tendon Reflexes: Reflexes are normal and symmetric. Reflexes normal.   Psychiatric:         Speech: Speech normal.         Behavior: Behavior normal.         Procedures          ED Course  ED Course as of 05/30/24 1104   Thu May 30, 2024   1003 Not having chest pain at this time [TS]   1037 Since cardiac markers elevated EKG does not show any evidence of acute STEMI BNP is elevated also and his blood pressure is up CT of the chest is negative for PE or aortic dissection [TS]      ED  Course User Index  [TS] Guillermo Grossman MD                                             Medical Decision Making  Differential Diagnosis:  I considered chest wall pain, muscle strain, costochondritis, pleurisy, rib fracture, herpes zoster, cardiovascular etiology, myocardial infarction, intermediate coronary syndrome, unstable angina, angina, aortic dissection, pericarditis, pulmonary etiology, pulmonary embolism, pneumonia, pneumothorax, lung cancer, gastroesophageal reflux disease, esophagitis, esophageal spasm and gastrointestinal etiology as a possible cause of chest pain in this patient. This is a partial list of diagnoses considered.        Problems Addressed:  Hypertensive emergency: complicated acute illness or injury  Non-STEMI (non-ST elevated myocardial infarction): complicated acute illness or injury  Pulmonary nodule: undiagnosed new problem with uncertain prognosis     Details: Require outpatient management patient informed    Amount and/or Complexity of Data Reviewed  Labs: ordered.     Details: Labs reviewed  Radiology: ordered.     Details: CT scan reviewed  ECG/medicine tests: ordered and independent interpretation performed.     Details: Normal sinus rhythm nonspecific ST-T wave changes  Discussion of management or test interpretation with external provider(s): Discussed with the cardiology Dr. Abrams the patient can go to the Cath Lab discussed with intensivist the patient will be admitted to ICU for blood pressure control.    Risk  OTC drugs.  Prescription drug management.  Decision regarding hospitalization.  Risk Details: Heart score 4  Wells score 0  Years Algorithm for Pulmonary Embolus  To be used in hemodynamically stable patient >18 years old    No signs of DVT are present, there is no hemoptysis, PE is not the most likely diagnosis and the D-dimer is not greater or equal to 1000 ng/mL. Therefore PE is excluded . The Years algorithm rules out PE (0.43 % with symptomatic VTE during 3-month  follow-up)    Patient will be admitted to ICU blood pressure control and is going to Cath Lab.        Final diagnoses:   Hypertensive emergency   Non-STEMI (non-ST elevated myocardial infarction)   Pulmonary nodule       ED Disposition  ED Disposition       ED Disposition   Decision to Admit    Condition   --    Comment   Level of Care: Critical Care [6]   Diagnosis: Hypertensive emergency [910023]   Admitting Physician: MANUEL HOLT [[id number]]   Attending Physician: MANUEL HOLT [[id number]]   Certification: I Certify That Inpatient Hospital Services Are Medically Necessary For Greater Than 2 Midnights                 No follow-up provider specified.       Medication List      No changes were made to your prescriptions during this visit.            Guillermo Grossman MD  05/30/24 0935       Guillermo Grossman MD  05/30/24 1045       Guillermo Grossman MD  05/30/24 1104      Electronically signed by Guillermo Grossman MD at 05/30/24 1104       Vital Signs (last 2 days)       Date/Time Temp Temp src Pulse Resp BP Patient Position SpO2    05/31/24 0800 -- -- 78 -- 130/95 -- 93    05/31/24 0700 -- -- 73 -- 149/99 -- 95    05/31/24 0630 -- -- 75 -- 133/104 -- 96    05/31/24 0615 -- -- 72 -- -- -- 95    05/31/24 0600 -- -- 73 -- 127/93 -- 95    05/31/24 0545 -- -- 75 -- -- -- 95    05/31/24 0530 -- -- 74 -- 137/86 -- 93    05/31/24 0515 -- -- 75 -- -- -- 93    05/31/24 0500 -- -- 76 -- 142/86 -- 92    05/31/24 0445 -- -- 80 -- -- -- 95    05/31/24 0430 -- -- 76 -- 142/88 -- 96    05/31/24 0415 -- -- 79 -- -- -- 96    05/31/24 0400 98.7 (37.1) Oral 77 18 143/93 Lying 96    05/31/24 0345 -- -- 71 -- -- -- 94    05/31/24 0330 -- -- 69 -- 170/106 -- 96    05/31/24 0315 -- -- 72 -- -- -- 95    05/31/24 0300 -- -- 72 -- 162/106 -- 93    05/31/24 0245 -- -- 70 -- -- -- 96    05/31/24 0230 -- -- 67 -- 164/112 -- 95    05/31/24 0215 -- -- 74 -- -- -- 96    05/31/24 0200 -- -- 71 -- 153/105 -- 93    05/31/24 0145 -- -- 65 -- 152/114 -- 94     05/31/24 0130 -- -- 67 -- 129/114 -- 94    05/31/24 0115 -- -- 70 -- 162/101 -- 94    05/31/24 0100 -- -- 68 -- 132/118 -- 96    05/31/24 0045 -- -- 82 -- 168/97 -- 96    05/31/24 0030 -- -- 70 -- 158/112 -- 94    05/31/24 0015 -- -- 71 -- 144/109 -- 91    05/31/24 0000 98.6 (37) Oral 72 20 154/102 Lying 95    05/30/24 2345 -- -- 69 -- 144/113 -- 93    05/30/24 2330 -- -- 71 -- 147/114 -- 95 05/30/24 2315 -- -- 76 -- 139/103 -- 93 05/30/24 2300 -- -- 72 -- 160/96 -- 96    05/30/24 2245 -- -- 70 -- 145/105 -- 93 05/30/24 2230 -- -- 75 -- 149/113 -- 93 05/30/24 2215 -- -- 74 -- 148/104 -- 93 05/30/24 2200 -- -- 79 -- 155/96 -- 94 05/30/24 2145 -- -- 83 -- 134/84 -- 95 05/30/24 2130 -- -- 76 -- 148/100 -- 93    05/30/24 2115 -- -- 80 -- 142/91 -- 92 05/30/24 2100 -- -- 80 -- 84/70 -- 94    05/30/24 2045 -- -- 75 -- 141/89 -- 95 05/30/24 2030 -- -- 71 -- 150/104 -- 95    05/30/24 2015 -- -- 84 -- 160/108 -- 99    05/30/24 2000 -- -- 73 16 156/98 -- 96    05/30/24 1945 -- -- 71 -- 113/92 -- 95    05/30/24 1930 -- -- 72 -- 146/95 -- 95    05/30/24 1915 -- -- 74 -- 152/108 -- 98    05/30/24 1900 98.6 (37) Oral 73 18 135/101 Lying 94    05/30/24 1845 -- -- 76 -- 143/98 -- 95    05/30/24 1830 -- -- 74 -- 142/101 -- 93    05/30/24 1815 -- -- 75 -- 140/103 -- 94    05/30/24 1730 -- -- 74 -- 128/91 -- 94    05/30/24 1716 -- -- 78 -- -- -- 94    05/30/24 1630 -- -- 72 -- 137/99 -- 97    05/30/24 1600 98.5 (36.9) Oral 71 18 127/101 Lying 96    05/30/24 1530 -- -- 75 -- 132/102 -- 95    05/30/24 1515 -- -- 71 -- 143/96 -- 95    05/30/24 1500 -- -- 71 -- 143/100 -- 96    05/30/24 1430 -- -- 67 -- 148/99 -- 98    05/30/24 1415 -- -- 71 -- 140/93 -- 94    05/30/24 1400 -- -- 72 -- 141/92 -- 97    05/30/24 1345 -- -- 73 -- 141/97 -- 98    05/30/24 1330 -- -- 72 -- 128/90 -- 94    05/30/24 1315 -- -- 72 -- 132/88 -- 93    05/30/24 1300 -- -- 73 -- 137/90 -- 96    05/30/24 1245 -- -- 73 -- 128/110 -- 91     05/30/24 1230 -- -- 77 -- 138/93 -- 91    05/30/24 1215 98.8 (37.1) Oral 74 16 141/96 Lying 92    05/30/24 1031 -- -- 74 -- 152/111 -- 97    05/30/24 0922 98.8 (37.1) Oral 73 20 170/114 -- 98          Oxygen Therapy (last 2 days)       Date/Time SpO2 Device (Oxygen Therapy) Flow (L/min) Oxygen Concentration (%) ETCO2 (mmHg)    05/31/24 0800 93 room air -- -- --    05/31/24 0700 95 -- -- -- --    05/31/24 0630 96 -- -- -- --    05/31/24 0615 95 -- -- -- --    05/31/24 0600 95 -- -- -- --    05/31/24 0545 95 -- -- -- --    05/31/24 0530 93 -- -- -- --    05/31/24 0515 93 -- -- -- --    05/31/24 0500 92 -- -- -- --    05/31/24 0445 95 -- -- -- --    05/31/24 0430 96 -- -- -- --    05/31/24 0415 96 -- -- -- --    05/31/24 0400 96 room air -- -- --    05/31/24 0345 94 -- -- -- --    05/31/24 0330 96 -- -- -- --    05/31/24 0315 95 -- -- -- --    05/31/24 0300 93 -- -- -- --    05/31/24 0245 96 -- -- -- --    05/31/24 0230 95 -- -- -- --    05/31/24 0215 96 -- -- -- --    05/31/24 0200 93 -- -- -- --    05/31/24 0145 94 -- -- -- --    05/31/24 0130 94 -- -- -- --    05/31/24 0115 94 -- -- -- --    05/31/24 0100 96 -- -- -- --    05/31/24 0045 96 -- -- -- --    05/31/24 0030 94 -- -- -- --    05/31/24 0015 91 -- -- -- --    05/31/24 0000 95 room air -- -- --    05/30/24 2345 93 -- -- -- --    05/30/24 2330 95 -- -- -- --    05/30/24 2315 93 -- -- -- --    05/30/24 2300 96 -- -- -- --    05/30/24 2245 93 -- -- -- --    05/30/24 2230 93 -- -- -- --    05/30/24 2215 93 -- -- -- --    05/30/24 2200 94 -- -- -- --    05/30/24 2145 95 -- -- -- --    05/30/24 2130 93 -- -- -- --    05/30/24 2115 92 -- -- -- --    05/30/24 2100 94 -- -- -- --    05/30/24 2045 95 -- -- -- --    05/30/24 2030 95 -- -- -- --    05/30/24 2015 99 -- -- -- --    05/30/24 2000 96 room air -- -- --    05/30/24 1945 95 -- -- -- --    05/30/24 1930 95 -- -- -- --    05/30/24 1915 98 -- -- -- --    05/30/24 1900 94 room air -- -- --    05/30/24 1845 95 -- --  -- --    05/30/24 1830 93 -- -- -- --    05/30/24 1815 94 -- -- -- --    05/30/24 1730 94 -- -- -- --    05/30/24 1716 94 room air -- -- --    05/30/24 1630 97 -- -- -- --    05/30/24 1600 96 room air -- -- --    05/30/24 1530 95 -- -- -- --    05/30/24 1515 95 -- -- -- --    05/30/24 1500 96 -- -- -- --    05/30/24 1430 98 -- -- -- --    05/30/24 1415 94 -- -- -- --    05/30/24 1400 97 -- -- -- --    05/30/24 1345 98 -- -- -- --    05/30/24 1330 94 -- -- -- --    05/30/24 1315 93 -- -- -- --    05/30/24 1300 96 -- -- -- --    05/30/24 1245 91 -- -- -- --    05/30/24 1230 91 -- -- -- --    05/30/24 1215 92 room air -- -- --    05/30/24 1200 -- room air -- -- --    05/30/24 11:14:32 -- nasal cannula with ETCO2 2 -- --    05/30/24 1031 97 -- -- -- --    05/30/24 0922 98 -- -- -- --          Intake & Output (last 2 days)         05/29 0701 05/30 0700 05/30 0701 05/31 0700 05/31 0701 06/01 0700    P.O.  950 240    Total Intake(mL/kg)  950 (12.3) 240 (3.1)    Urine (mL/kg/hr)  2075     Total Output  2075     Net  -1125 +240                 Facility-Administered Medications as of 5/31/2024   Medication Dose Route Frequency Provider Last Rate Last Admin    acetaminophen (TYLENOL) tablet 650 mg  650 mg Oral Q4H PRN Andrez Abrams MD        amLODIPine (NORVASC) tablet 10 mg  10 mg Oral Q24H Andrez Abrams MD   10 mg at 05/31/24 0843    And    lisinopril (PRINIVIL,ZESTRIL) tablet 40 mg  40 mg Oral Q24H Andrez Abrams MD   40 mg at 05/31/24 0844    [COMPLETED] aspirin chewable tablet 324 mg  324 mg Oral Once Guillermo Grossman MD   324 mg at 05/30/24 0935    aspirin EC tablet 81 mg  81 mg Oral Daily Andrez Abrams MD   81 mg at 05/31/24 0843    aspirin EC tablet 81 mg  81 mg Oral Daily Andrez Abrams MD        Chlorhexidine Gluconate Cloth 2 % pads 1 Application  1 application  Topical Once Steve Horton MD        Chlorhexidine Gluconate Cloth 2 % pads 1 Application  1 Application  Topical Q24H Steve Horton MD        clopidogrel (PLAVIX) tablet 75 mg  75 mg Oral Daily Andrez Abrams MD   75 mg at 24 0843    Enoxaparin Sodium (LOVENOX) syringe 80 mg  1 mg/kg Subcutaneous Q12H Andrez Abrams MD   80 mg at 24 0601    [COMPLETED] Enoxaparin Sodium (LOVENOX) syringe 90 mg  1 mg/kg Subcutaneous Once Andrez Abrams MD   90 mg at 24 1422    furosemide (LASIX) injection 40 mg  40 mg Intravenous Q12H Andrez Abrams MD   40 mg at 24 0200    hydrALAZINE (APRESOLINE) injection 10 mg  10 mg Intravenous Q6H PRN Andrez Abrams MD   10 mg at 24 0343    [COMPLETED] iopamidol (ISOVUE-370) 76 % injection 100 mL  100 mL Intravenous Once in imaging uGillermo Grossman MD   100 mL at 24 1011    [COMPLETED] labetalol (NORMODYNE,TRANDATE) injection 20 mg  20 mg Intravenous Once Guillermo Grossman MD   20 mg at 24 0937    mupirocin (BACTROBAN) 2 % nasal ointment 1 Application  1 application  Each Nare BID Steve Horton MD   1 Application at 24 0845    [Held by provider] nebivolol (BYSTOLIC) tablet 20 mg  20 mg Oral Daily Andrez Abrams MD        niCARdipine (CARDENE) 25 mg in 250 mL NS infusion kit  5-15 mg/hr Intravenous Titrated Andrez Abrams MD   Stopped at 24 1334    nitroglycerin (NITROSTAT) SL tablet 0.4 mg  0.4 mg Sublingual Q5 Min PRN Andrez Abrams MD        ondansetron ODT (ZOFRAN-ODT) disintegrating tablet 4 mg  4 mg Oral Q6H PRN Andrez Abrams MD        Or    ondansetron (ZOFRAN) injection 4 mg  4 mg Intravenous Q6H PRN Andrez Abrams MD        sertraline (ZOLOFT) tablet 150 mg  150 mg Oral Daily Andrez Abrams MD   150 mg at 24 0844    sodium chloride 0.9 % flush 10 mL  10 mL Intravenous PRN Andrez Abrams MD        sodium chloride 0.9 % flush 10 mL  10 mL Intravenous PRN Andrez Abrams MD        [] sodium chloride 0.9 %  infusion  100 mL/hr Intravenous Continuous Andrez Abrams  mL/hr at 05/30/24 1421 100 mL/hr at 05/30/24 1421     Orders (last 48 hrs)        Start     Ordered    05/31/24 0900  sertraline (ZOLOFT) tablet 150 mg  Daily         05/30/24 1326    05/31/24 0900  aspirin EC tablet 81 mg  Daily         05/30/24 1326    05/31/24 0825  Follow Pressure Ulcer Prevention Measures Policy  Continuous        Comments: Implement Appropriate Pressure Ulcer Prevention Measures  - Open Order Report to View Full Instructions  Enter Wound LDA & Document Assessment  Add Wound Care Plan  Add Patient Education Per Policy    05/31/24 0824    05/31/24 0825  Turn Patient  Now Then Every 2 Hours         05/31/24 0824    05/31/24 0825  Elevate Heels Off of Bed  Until Discontinued         05/31/24 0824    05/31/24 0825  Use Seat Cushion When Up In Chair  Continuous         05/31/24 0824    05/31/24 0825  Use Repositioning Wedge to Position Patient  Continuous         05/31/24 0824    05/31/24 0600  CBC (No Diff)  Morning Draw         05/30/24 1326    05/31/24 0600  Basic Metabolic Panel  Morning Draw         05/30/24 1326    05/31/24 0600  Hemoglobin A1c  Morning Draw         05/30/24 1326    05/31/24 0600  Lipid Panel  Morning Draw        Comments: Fasting      05/30/24 1326    05/31/24 0600  Enoxaparin Sodium (LOVENOX) syringe 80 mg  Every 12 Hours Scheduled         05/30/24 2108    05/31/24 0400  Enoxaparin Sodium (LOVENOX) syringe 90 mg  Every 12 Hours Scheduled,   Status:  Discontinued         05/30/24 1228    05/31/24 0400  Chlorhexidine Gluconate Cloth 2 % pads 1 Application  Every 24 Hours         05/30/24 1716    05/31/24 0000  Adult Transthoracic Echo Complete W/ Cont if Necessary Per Protocol  Once         05/30/24 1326    05/30/24 2200  hydrALAZINE (APRESOLINE) tablet 25 mg  Every 8 Hours Scheduled,   Status:  Discontinued         05/30/24 1714    05/30/24 1815  mupirocin (BACTROBAN) 2 % nasal ointment 1 Application  2  "Times Daily         05/30/24 1716    05/30/24 1815  Chlorhexidine Gluconate Cloth 2 % pads 1 Application  Once         05/30/24 1716 05/30/24 1717  Lactic Acid, Plasma  STAT,   Status:  Canceled         05/30/24 1716 05/30/24 1600  Strict intake and output  Every 4 Hours       05/30/24 1326    05/30/24 1415  amLODIPine (NORVASC) tablet 10 mg  Every 24 Hours Scheduled        Placed in \"And\" Linked Group    05/30/24 1326    05/30/24 1415  lisinopril (PRINIVIL,ZESTRIL) tablet 40 mg  Every 24 Hours Scheduled        Placed in \"And\" Linked Group    05/30/24 1326    05/30/24 1415  aspirin EC tablet 81 mg  Daily         05/30/24 1326    05/30/24 1415  [Held by provider]  nebivolol (BYSTOLIC) tablet 20 mg  Daily        (On hold since yesterday at 1715 until manually unheld; held by Steve Horton MDHold Reason: Other (Comment Required)Hold Comments: Will switch to coreg tomorrow)    05/30/24 1326    05/30/24 1415  sodium chloride 0.9 % infusion  Continuous         05/30/24 1326    05/30/24 1415  clopidogrel (PLAVIX) tablet 75 mg  Daily         05/30/24 1326    05/30/24 1415  furosemide (LASIX) injection 40 mg  Every 12 Hours         05/30/24 1326    05/30/24 1327  Code Status and Medical Interventions:  Continuous         05/30/24 1326    05/30/24 1327  Continuous Cardiac Monitoring  Continuous        Comments: Follow Standing Orders As Outlined in Process Instructions (Open Order Report to View Full Instructions)    05/30/24 1326    05/30/24 1327  Maintain IV Access  Continuous         05/30/24 1326    05/30/24 1327  Telemetry - Place Orders & Notify Provider of Results When Patient Experiences Acute Chest Pain, Dysrhythmia or Respiratory Distress  Continuous        Comments: Open Order Report to View Parameters Requiring Provider Notification    05/30/24 1326    05/30/24 1327  Encourage fluids  Until Discontinued         05/30/24 1326    05/30/24 1327  Activity - Strict Bed Rest  Until Discontinued         05/30/24 " "1326 05/30/24 1327  Closure Device Used  Once         05/30/24 1326 05/30/24 1327  Assess Puncture Site, Vital Signs, Distal Pulses & Observe Site for Bleeding or Swelling  Per Order Details        Comments: Every 15 Minutes x4, Every 30 Minutes x4, & Every 1 Hour x2    05/30/24 1326 05/30/24 1327  Oxygen Therapy- Nasal Cannula; Titrate 1-6 LPM Per SpO2; 90 - 95%  Continuous         05/30/24 1326 05/30/24 1327  Continuous Pulse Oximetry  Continuous         05/30/24 1326 05/30/24 1327  Diet: Cardiac; Healthy Heart (2-3 Na+); Fluid Consistency: Thin (IDDSI 0)  Diet Effective Now         05/30/24 1326 05/30/24 1327  Notify MD if platelet count is less than 100,000, is less than 1/2 baseline, or if Hgb drops by more than 3mg/dl.  Until Discontinued         05/30/24 1326 05/30/24 1327  Notify MD of hypotension (SBP less than 95), bleeding, or dysrythmia and follow Sheath Removal Policy if needed.  Until Discontinued         05/30/24 1326 05/30/24 1327  Cardiac Rehab Evaluation  Once        Provider:  (Not yet assigned)    05/30/24 1326 05/30/24 1327  Hold metFORMIN (GLUCOPHAGE) for 48 Hours  Until Discontinued         05/30/24 1326 05/30/24 1326  acetaminophen (TYLENOL) tablet 650 mg  Every 4 Hours PRN         05/30/24 1326 05/30/24 1326  ondansetron ODT (ZOFRAN-ODT) disintegrating tablet 4 mg  Every 6 Hours PRN        Placed in \"Or\" Linked Group    05/30/24 1326 05/30/24 1326  ondansetron (ZOFRAN) injection 4 mg  Every 6 Hours PRN        Placed in \"Or\" Linked Group    05/30/24 1326 05/30/24 1326  hydrALAZINE (APRESOLINE) injection 10 mg  Every 6 Hours PRN         05/30/24 1326 05/30/24 1326  nitroglycerin (NITROSTAT) SL tablet 0.4 mg  Every 5 Minutes PRN         05/30/24 1326    05/30/24 1300  Enoxaparin Sodium (LOVENOX) syringe 90 mg  Once         05/30/24 1234    05/30/24 1138  heparin infusion 2 units/mL in 0.9% NaCl  Code / Trauma / Sedation Medication,   Status:  " Discontinued         05/30/24 1138    05/30/24 1138  heparin infusion 2 units/mL in 0.9% NaCl  Code / Trauma / Sedation Medication,   Status:  Discontinued         05/30/24 1138    05/30/24 1138  iopamidol (ISOVUE-370) 76 % injection  Code / Trauma / Sedation Medication,   Status:  Discontinued         05/30/24 1138    05/30/24 1131  ECG 12 Lead Chest Pain  Now & in 2 Hours         05/30/24 0934    05/30/24 1130  High Sensitivity Troponin T 2Hr  PROCEDURE ONCE         05/30/24 1001    05/30/24 1122  lidocaine (XYLOCAINE) 2% injection  Code / Trauma / Sedation Medication,   Status:  Discontinued         05/30/24 1122    05/30/24 1118  midazolam (VERSED) injection  Code / Trauma / Sedation Medication,   Status:  Discontinued         05/30/24 1118    05/30/24 1118  fentaNYL citrate (PF) (SUBLIMAZE) injection  Code / Trauma / Sedation Medication,   Status:  Discontinued         05/30/24 1118    05/30/24 1104  Inpatient Admission  Once         05/30/24 1104    05/30/24 1101  Fentanyl, Urine - Urine, Clean Catch  Once         05/30/24 1100    05/30/24 1051  niCARdipine (CARDENE) 25 mg in 250 mL NS infusion kit  Titrated         05/30/24 1035    05/30/24 1044  Cardiac Catheterization/Vascular Study  Once         05/30/24 1043    05/30/24 1042  Case Request  Once         05/30/24 1042    05/30/24 1019  iopamidol (ISOVUE-370) 76 % injection 100 mL  Once in Imaging         05/30/24 1003    05/30/24 0948  labetalol (NORMODYNE,TRANDATE) injection 20 mg  Once         05/30/24 0932    05/30/24 0940  aspirin chewable tablet 324 mg  Once         05/30/24 0924    05/30/24 0932  D-dimer, Quantitative  Once         05/30/24 0932    05/30/24 0932  BNP  Once         05/30/24 0932    05/30/24 0932  Urine Drug Screen - Urine, Clean Catch  Once         05/30/24 0932    05/30/24 0932  CT Angiogram Chest  1 Time Imaging         05/30/24 0932    05/30/24 0931  Cardiac Monitoring  Continuous,   Status:  Canceled        Comments: Follow  Standing Orders As Outlined in Process Instructions (Open Order Report to View Full Instructions)    05/30/24 0932 05/30/24 0931  Continuous Pulse Oximetry, Add Qxygen if SaO2 is Below 90%  Continuous,   Status:  Canceled        Comments: Add Oxygen if SaO2 is Below 90%.    05/30/24 0932 05/30/24 0931  Vital Signs  Per Hospital Policy         05/30/24 0932 05/30/24 0931  ECG 12 Lead Chest Pain  Now & in 2 Hours,   Status:  Canceled       05/30/24 0932 05/30/24 0931  Insert Peripheral IV  Once         05/30/24 0932 05/30/24 0931  [place] Draw  Once,   Status:  Canceled         05/30/24 0932 05/30/24 0931  Green Top (Gel)  PROCEDURE ONCE,   Status:  Canceled         05/30/24 0932 05/30/24 0931  Lavender Top  PROCEDURE ONCE,   Status:  Canceled         05/30/24 0932 05/30/24 0931  Red Top  PROCEDURE ONCE,   Status:  Canceled         05/30/24 0932 05/30/24 0931  Light Blue Top  PROCEDURE ONCE,   Status:  Canceled         05/30/24 0932 05/30/24 0930  Oxygen Therapy- Nasal Cannula; Titrate 1-6 LPM Per SpO2; 90 - 95%  Continuous PRN,   Status:  Canceled       05/30/24 0932 05/30/24 0930  sodium chloride 0.9 % flush 10 mL  As Needed         05/30/24 0932 05/30/24 0924  ECG 12 Lead Chest Pain  STAT         05/30/24 0923 05/30/24 0924  NPO Diet NPO Type: Strict NPO  Diet Effective Now,   Status:  Canceled         05/30/24 0924 05/30/24 0924  Undress & Gown  Once         05/30/24 0924 05/30/24 0924  Cardiac Monitoring  Continuous,   Status:  Canceled        Comments: Follow Standing Orders As Outlined in Process Instructions (Open Order Report to View Full Instructions)    05/30/24 0924 05/30/24 0924  Continuous Pulse Oximetry, Add Oxygen if Sa02 is Below 90%  Continuous,   Status:  Canceled        Comments: Add Oxygen if SaO2 is Less Than 90%.    05/30/24 0924 05/30/24 0924  XR Chest 1 View  1 Time Imaging         05/30/24 0924 05/30/24 0924  Insert Peripheral IV  Once          05/30/24 0924    05/30/24 0924  [place] Draw  Once         05/30/24 0924    05/30/24 0924  CBC & Differential  Once         05/30/24 0924    05/30/24 0924  Comprehensive Metabolic Panel  Once         05/30/24 0924    05/30/24 0924  High Sensitivity Troponin T  Once         05/30/24 0924    05/30/24 0924  Green Top (Gel)  PROCEDURE ONCE         05/30/24 0924    05/30/24 0924  Lavender Top  PROCEDURE ONCE         05/30/24 0924    05/30/24 0924  Red Top  PROCEDURE ONCE,   Status:  Canceled         05/30/24 0924    05/30/24 0924  Light Blue Top  PROCEDURE ONCE         05/30/24 0924    05/30/24 0924  CBC Auto Differential  PROCEDURE ONCE         05/30/24 0924 05/30/24 0923  Oxygen Therapy- Nasal Cannula; Titrate 1-6 LPM Per SpO2; 90 - 95%  Continuous PRN,   Status:  Canceled       05/30/24 0924 05/30/24 0923  sodium chloride 0.9 % flush 10 mL  As Needed         05/30/24 0924    Unscheduled  ECG 12 Lead Chest Pain  As Needed      Comments: Q15 minutes x 4 for first hour after persistent or recurrent chest pain    05/30/24 0932    Unscheduled  ECG 12 Lead Chest Pain  As Needed      Comments: Persistent, unrelieved or recurrent chest pain after the first hour of treatment    05/30/24 0932    Unscheduled  Vital Signs  As Needed       05/30/24 1326    Unscheduled  Change site dressing  As Needed       05/30/24 1326    Unscheduled  Head of Bed: Flat; 2 Hours; Elevate Head of Bed: 30 Degrees; 2 Hours; Keep Affected Extremity/ Extremities Straight; Total Bedrest Time? 4 Hours  As Needed       05/30/24 1326    Unscheduled  Wound Care  As Needed       05/31/24 0824    --  amLODIPine-valsartan (EXFORGE)  MG per tablet  Daily         05/30/24 1329                     Physician Progress Notes (last 48 hours)        Daniel Mckeon MD at 05/31/24 0736          Continuity note:    Informed the emergency room of Mr. Smart's admission for NSTEMI.  He has been admitted to the intensivist service and cardiac  catheterization performed yesterday by cardiology.  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  He has had very difficult to control blood pressure, made more difficult to control due to spotty compliance with follow-up appointments.  I last saw him September last year.  All events of current hospitalization have been reviewed and noted.  I will follow along peripherally with anticipation of transfer to the floor once he is medically stable.  Please feel free to call with any questions or concerns.    Electronically signed by Daniel Mckeon MD, 05/31/24, 7:38 AM CDT.     Electronically signed by Daniel Mckeon MD at 05/31/24 6602          Consult Notes (last 48 hours)        Andrez Abrams MD at 05/30/24 1047          Consults    Consult from: Dr. Guillermo Grossman MD  Reason for Consultation: Elevated troponin    Chief Complaint: Elevated blood pressure, chest pain    HPI: This is a 54-year-old male with no personal history of cardiovascular disease but with a history of primary hypertension, tobacco use who presents with elevated blood pressure and also after 2 episodes of chest discomfort.  The patient notes that he began to experience substernal chest comfort yesterday morning.  He says this felt like a tightness and sharp pain across the chest and in the shoulders.  This lasted for few hours then resolved however returned about midnight.  Both episodes were unprovoked.  The second episode also lasted about 2 hours and subsequently resolved.  The patient noticed that his blood pressure was elevated.  He denies having headaches, visual changes, focal neurologic symptoms.  He describes stable breathing.  He denies having palpitations, lightheadedness, dizziness or syncope.  He presented to the emergency department for further evaluation chest pain-free but was found to have significant elevation of cardiac enzymes without acute EKG changes.    He  "does note a medical history of hypertension and a family history of coronary artery disease in his mother and also a stroke in his mother.  The patient is a smoker and drinks 3-4 beers per day.  He denies any illicit drug use.    Past Medical History:   Diagnosis Date    Hypertension      Past Surgical History:   Procedure Laterality Date    ANKLE SURGERY      HERNIA REPAIR      TONSILLECTOMY       Prior to Admission medications    Medication Sig Start Date End Date Taking? Authorizing Provider   amLODIPine-benazepril (LOTREL) 10-40 MG per capsule Take 1 capsule by mouth Daily.    Robert Cortes MD   aspirin 81 MG EC tablet Take 2 tablets every day by oral route.    Robert Cortes MD   nebivolol (BYSTOLIC) 20 MG tablet Take 1 tablet by mouth Daily.    Robert Cortes MD   sertraline (ZOLOFT) 100 MG tablet TAKE 1 AND 1/2 TABLETS DAILY BY MOUTH    Robert Cortes MD     Allergies   Allergen Reactions    Cat Hair Extract Hives     Social History     Tobacco Use    Smoking status: Every Day     Types: Cigars    Smokeless tobacco: Never   Substance Use Topics    Alcohol use: Not Currently     Comment: 3-4 beers per day     Family History   Problem Relation Age of Onset    Hypertension Mother     Diabetes Mother     Stroke Mother     Coronary artery disease Mother     Hypertension Father      Review of Systems: All pertinent negative and positives as noted above.  Otherwise, all systems reviewed and found to be negative.    Physical Exam:    BP (!) 152/111   Pulse 74   Temp 98.8 °F (37.1 °C) (Oral)   Resp 20   Ht 152.4 cm (60\")   Wt 90.7 kg (200 lb)   SpO2 97%   BMI 39.06 kg/m²   Temp:  [98.8 °F (37.1 °C)] 98.8 °F (37.1 °C)  Heart Rate:  [73-74] 74  Resp:  [20] 20  BP: (152-170)/(111-114) 152/111    Gen.: Awake alert and oriented ×3 and in no acute distress  HEENT: Normocephalic, atraumatic, pupils equally round and reactive to light, oropharynx clear, mucous membranes moist  Neck: Supple, " no elevation of JVP, no thyromegaly, no carotid bruits  CV: Regular rate and rhythm, no audible murmurs, rubs, gallops  Pulmonary: Clear to auscultation bilaterally, no wheezes, rhonchi or rales  GI: Soft, nontender, nondistended, active bowel sounds  Extremities: Warm and well-perfused, no dermatitis or ulceration, no clubbing, cyanosis, edema  Neurologic: Cranial nerves are grossly intact, patient moves all 4 extremities equally during examination    Diagnostic Data:    Lab Results   Component Value Date    WBC 8.16 05/30/2024    HGB 16.8 05/30/2024    HCT 55.2 (H) 05/30/2024    MCV 81.5 05/30/2024     05/30/2024     Lab Results   Component Value Date    GLUCOSE 132 (H) 05/30/2024    CALCIUM 8.9 05/30/2024     05/30/2024    K 4.6 05/30/2024    CO2 27.0 05/30/2024     05/30/2024    BUN 4 (L) 05/30/2024    CREATININE 0.69 (L) 05/30/2024    EGFR 108.6 05/30/2024    BCR 5.8 (L) 05/30/2024    ANIONGAP 9.0 05/30/2024     High-sensitivity troponin: 294  proBNP: 1863    I personally viewed the ECG tracing from 9:27 AM: This shows normal sinus rhythm with a rate of 69 bpm, left axis deviation, T wave inversions noted in leads V4 through V6    Urine drug screen: Negative    CTA chest:  1. No evidence of aortic aneurysm or dissection.  2. No evidence of pulmonary embolism.  3. An 11 mm nodule in the superior segment of the left lower lobe. The  possibility of neoplasm may not be excluded. Further evaluation by a  follow-up CT scan of the chest in 3 months and/or PET/CT imaging may be  obtained.    ASSESSMENT/PLAN:    1. NSTEMI  2. Hypertensive urgency with baseline primary hypertension  3. Tobacco abuse  4.  Polycythemia  5.  Pulmonary nodule  6.  Elevated BNP  7.  At least moderate alcohol use    Given this patient's significant troponin elevation, elevated BNP, multiple episodes of chest discomfort, there is concern for ischemic heart disease.  Therefore, I recommended coronary angiography.  I have  explained the procedural risks, benefits and alternatives the patient.  He is agreeable to proceed.    He will be admitted to the intensivist service for control of blood pressure.  We will certainly address any issues with coronary artery disease, heart failure, etc. that might be identified.    Further recommendations from cardiology will be pending the results of this patient's coronary angiography today.    Electronically signed by Andrez Abrams MD at 05/30/24 9755

## 2024-05-31 NOTE — PROGRESS NOTES
"RE:  Lilia Smart  :  1970    CC: chest pain         Subjective: Patient feels like he is doing well.  He denies any current chest pain.  He is off Cardene drip.  Blood pressure is still mild to moderately elevated.  He is concerned about the pulmonary nodule seen on CT scan.    ROS: Pertinent positives and negatives listed above.  All other systems reviewed and negative.    Objective:   /99   Pulse 75   Temp 98.3 °F (36.8 °C) (Oral)   Resp 16   Ht 152.4 cm (60\")   Wt 77.2 kg (170 lb 4.8 oz)   SpO2 96%   BMI 33.26 kg/m²   Temp:  [98.3 °F (36.8 °C)-98.7 °F (37.1 °C)] 98.3 °F (36.8 °C)  Heart Rate:  [65-84] 75  Resp:  [16-20] 16  BP: ()/() 143/99    Intake/Output Summary (Last 24 hours) at 2024 1313  Last data filed at 2024 1200  Gross per 24 hour   Intake 1750 ml   Output 2525 ml   Net -775 ml       Current Facility-Administered Medications:     acetaminophen (TYLENOL) tablet 650 mg, 650 mg, Oral, Q4H PRN, Andrez Abrams MD    amLODIPine (NORVASC) tablet 10 mg, 10 mg, Oral, Q24H, 10 mg at 24 0843 **AND** lisinopril (PRINIVIL,ZESTRIL) tablet 40 mg, 40 mg, Oral, Q24H, Andrez Abrams MD, 40 mg at 24 0844    aspirin EC tablet 81 mg, 81 mg, Oral, Daily, Andrez Abrams MD, 81 mg at 24 0843    atorvastatin (LIPITOR) tablet 40 mg, 40 mg, Oral, Nightly, Steve Horton MD    carvedilol (COREG) tablet 12.5 mg, 12.5 mg, Oral, BID With Meals, Steve Horton MD, 12.5 mg at 24 1045    Chlorhexidine Gluconate Cloth 2 % pads 1 Application, 1 Application, Topical, Q24H, Steve Horton MD    clopidogrel (PLAVIX) tablet 75 mg, 75 mg, Oral, Daily, Andrez Abrams MD, 75 mg at 24 0843    Enoxaparin Sodium (LOVENOX) syringe 80 mg, 1 mg/kg, Subcutaneous, Q12H, Andrez Abrams MD, 80 mg at 24 0601    furosemide (LASIX) injection 40 mg, 40 mg, Intravenous, Q12H, Andrez Abrams MD, 40 mg at 24 " 0200    hydrALAZINE (APRESOLINE) injection 10 mg, 10 mg, Intravenous, Q6H PRN, Andrez Abrams MD, 10 mg at 05/31/24 0343    mupirocin (BACTROBAN) 2 % nasal ointment 1 Application, 1 application , Each Nare, BID, Steve Horton MD, 1 Application at 05/31/24 0845    niCARdipine (CARDENE) 25 mg in 250 mL NS infusion kit, 5-15 mg/hr, Intravenous, Titrated, Andrez Abrams MD, Stopped at 05/30/24 1334    ondansetron ODT (ZOFRAN-ODT) disintegrating tablet 4 mg, 4 mg, Oral, Q6H PRN **OR** ondansetron (ZOFRAN) injection 4 mg, 4 mg, Intravenous, Q6H PRN, Andrez Abrams MD    sertraline (ZOLOFT) tablet 150 mg, 150 mg, Oral, Daily, Andrez Abrams MD, 150 mg at 05/31/24 0844    sodium chloride 0.9 % flush 10 mL, 10 mL, Intravenous, PRN, Andrez Abrams MD    sodium chloride 0.9 % flush 10 mL, 10 mL, Intravenous, PRN, Andrez Abrams MD    Physical Exam:   Gen: Alert and oriented x3, no acute distress  Heart: Regular rate and rhythm, no murmurs, rubs, or gallops  Chest: Lungs clear to auscultation bilaterally, normal respiratory effort  Abd: Soft, non tender, bowel sounds are positive  Ext: No clubbing, cyanosis, or edema     Lab Results (last 24 hours)       Procedure Component Value Units Date/Time    Basic Metabolic Panel [[id number]]  (Abnormal) Collected: 05/31/24 0226    Specimen: Blood Updated: 05/31/24 0314     Glucose 161 mg/dL      BUN 10 mg/dL      Creatinine 0.90 mg/dL      Sodium 136 mmol/L      Potassium 3.8 mmol/L      Chloride 100 mmol/L      CO2 26.0 mmol/L      Calcium 9.0 mg/dL      BUN/Creatinine Ratio 11.1     Anion Gap 10.0 mmol/L      eGFR 100.2 mL/min/1.73     Narrative:      GFR Normal >60  Chronic Kidney Disease <60  Kidney Failure <15      Lipid Panel [[id number]]  (Abnormal) Collected: 05/31/24 0226    Specimen: Blood Updated: 05/31/24 0314     Total Cholesterol 123 mg/dL      Triglycerides 198 mg/dL      HDL Cholesterol 30 mg/dL      LDL Cholesterol   60 mg/dL      VLDL Cholesterol 33 mg/dL      LDL/HDL Ratio 1.78    Narrative:      Cholesterol Reference Ranges  (U.S. Department of Health and Human Services ATP III Classifications)    Desirable          <200 mg/dL  Borderline High    200-239 mg/dL  High Risk          >240 mg/dL      Triglyceride Reference Ranges  (U.S. Department of Health and Human Services ATP III Classifications)    Normal           <150 mg/dL  Borderline High  150-199 mg/dL  High             200-499 mg/dL  Very High        >500 mg/dL    HDL Reference Ranges  (U.S. Department of Health and Human Services ATP III Classifications)    Low     <40 mg/dl (major risk factor for CHD)  High    >60 mg/dl ('negative' risk factor for CHD)        LDL Reference Ranges  (U.S. Department of Health and Human Services ATP III Classifications)    Optimal          <100 mg/dL  Near Optimal     100-129 mg/dL  Borderline High  130-159 mg/dL  High             160-189 mg/dL  Very High        >189 mg/dL    Hemoglobin A1c [[id number]]  (Abnormal) Collected: 05/31/24 0226    Specimen: Blood Updated: 05/31/24 0303     Hemoglobin A1C 6.90 %     Narrative:      Hemoglobin A1C Ranges:    Increased Risk for Diabetes  5.7% to 6.4%  Diabetes                     >= 6.5%  Diabetic Goal                < 7.0%    CBC (No Diff) [[id number]]  (Abnormal) Collected: 05/31/24 0226    Specimen: Blood Updated: 05/31/24 0255     WBC 8.76 10*3/mm3      RBC 6.41 10*6/mm3      Hemoglobin 16.0 g/dL      Hematocrit 52.2 %      MCV 81.4 fL      MCH 25.0 pg      MCHC 30.7 g/dL      RDW 18.3 %      RDW-SD 48.6 fl      MPV 10.2 fL      Platelets 252 10*3/mm3           Imaging Results (Last 24 Hours)       ** No results found for the last 24 hours. **          -CTA chest (5/30/2024) no evidence of aortic aneurysm or dissection, no evidence of PE, 11 mm nodule in the superior segment of the left lower lobe  -LHC (5/30/2024) no obstructive coronary artery disease, dissection of LCx, moderate stenosis in  a small caliber diagonal branch, small nondominant RCA, acute diastolic CHF secondary to uncontrolled hypertension with marked elevated LVEDP of 30 to 35 mmHg, preserved LV systolic function  -Echo (5/31/2024) EF 61 to 65%, mild LVH, grade 1 diastolic dysfunction, normal RV size and function, no significant valve dysfunction    Assessment:   1.  Non-STEMI: Secondary to coronary artery dissection and hypertensive urgency.  2.  Hypertensive urgency: Now off Cardene drip.  3.  Acute diastolic CHF: Markedly elevated LVEDP on [facility].  4.  Tobacco abuse  5.  Polycythemia  6.  Pulmonary nodule: 11 mm nodule in the left lower lobe  7.  Elevated BNP  8.  Alcohol use    Plan:   -Continue aspirin and Plavix.  -Continue enoxaparin during admission.  -Continue to optimize blood pressure control.  -Continue IV diuresis with Lasix with close monitoring of renal function and electrolytes.  -Counseled on smoking cessation.  -Okay to transfer to telemetry from a cardiac standpoint.

## 2024-05-31 NOTE — SIGNIFICANT NOTE
[facility] called to set up a CT of the chest for after d/c from hospital.  We were unable to do at this time because physician order was not placed.

## 2024-05-31 NOTE — CASE MANAGEMENT/SOCIAL WORK
Discharge Planning Assessment  [facility]     Patient Name: Lilia Smart  MRN: [id number]  Today's Date: 5/31/2024    Admit Date: 5/30/2024        Discharge Needs Assessment       Row Name 05/31/24 1141       Living Environment    People in Home alone    Current Living Arrangements home    Primary Care Provided by self    Provides Primary Care For no one    Family Caregiver if Needed sibling(s)    Family Caregiver Names Asia    Able to Return to Prior Arrangements yes       Resource/Environmental Concerns    Resource/Environmental Concerns none       Transition Planning    Patient/Family Anticipates Transition to home with family    Transportation Anticipated family or friend will provide       Discharge Needs Assessment    Readmission Within the Last 30 Days no previous admission in last 30 days    Equipment Currently Used at Home none    Concerns to be Addressed no discharge needs identified    Equipment Needed After Discharge none    Discharge Coordination/Progress spoke to patient who lives alone with sister nearby and able to assist him if needed patient is working and independent at home prior to illness; has RX coverage and PCP; will follow for DC needs                   Discharge Plan    No documentation.                 Continued Care and Services - Admitted Since 5/30/2024    No active coordination exists for this encounter.          Demographic Summary    No documentation.                  Functional Status    No documentation.                  Psychosocial    No documentation.                  Abuse/Neglect    No documentation.                  Legal    No documentation.                  Substance Abuse    No documentation.                  Patient Forms    No documentation.                     Dolly Nielsen RN

## 2024-05-31 NOTE — NURSING NOTE
[facility]  INPATIENT WOUND & OSTOMY CARE    Today's Date: 05/31/24    Patient Name: Lilia Smart  MRN: [id number]  [facility]: [id number]  PCP: Daniel Mckeon MD  Attending Provider: Steve Horton MD  Length of Stay: 1    I placed pressure injury prevention measure orders per protocol due to patient being at risk for skin breakdown and being admitted to the unit. Please reach out to wound care nurse if any skin issues arise.     This document has been electronically signed by Alyssa Alaniz RN on 5/31/2024 08:25 CDT

## 2024-05-31 NOTE — PROGRESS NOTES
"Pharmacy Dosing Service  Anticoagulant  Enoxaparin    Assessment/Action/Plan:  Non-STEMI (non-ST elevated myocardial infarction)   On Lovenox therapeutic dose, 1 mg/kg subQ Q12H  Patient requests dose no earlier than 0600  Current dose 90 mg, adjusted to 80 mg for newest charted weight of 77.2 kg  Next dose 0600 05/31/24.     Subjective:  Lilia Smart is a 54 y.o. male on Enoxaparin 80 mg SQ every 12 hours for indication of ACS.  Objective:  [Ht: 152.4 cm (60\"); Wt: 77.2 kg (170 lb 4.8 oz); BMI: Body mass index is 33.26 kg/m².]  Estimated Creatinine Clearance: 103 mL/min (A) (by C-G formula based on SCr of 0.69 mg/dL (L)). No results found for: \"DDIMER\" No results found for: \"INR\", \"PROTIME\"   Lab Results   Component Value Date    HGB 16.8 05/30/2024      Lab Results   Component Value Date     05/30/2024       Marco Fong, PharmD  05/30/24 21:09 CDT     "

## 2024-05-31 NOTE — PLAN OF CARE
Goal Outcome Evaluation:  Plan of Care Reviewed With: patient        Progress: improving.  Patient is alert and oriented x4, and is eager to learn.

## 2024-06-01 PROBLEM — I16.0 HYPERTENSIVE URGENCY: Status: ACTIVE | Noted: 2024-06-01

## 2024-06-01 LAB
ALBUMIN SERPL-MCNC: 3.8 G/DL (ref 3.5–5.2)
ALBUMIN/GLOB SERPL: 1.3 G/DL
ALP SERPL-CCNC: 79 U/L (ref 39–117)
ALT SERPL W P-5'-P-CCNC: 17 U/L (ref 1–41)
ANION GAP SERPL CALCULATED.3IONS-SCNC: 10 MMOL/L (ref 5–15)
ANISOCYTOSIS BLD QL: ABNORMAL
AST SERPL-CCNC: 15 U/L (ref 1–40)
BASOPHILS # BLD MANUAL: 0 10*3/MM3 (ref 0–0.2)
BASOPHILS NFR BLD MANUAL: 0 % (ref 0–1.5)
BILIRUB SERPL-MCNC: 0.8 MG/DL (ref 0–1.2)
BUN SERPL-MCNC: 19 MG/DL (ref 6–20)
BUN/CREAT SERPL: 22.1 (ref 7–25)
CALCIUM SPEC-SCNC: 9.7 MG/DL (ref 8.6–10.5)
CHLORIDE SERPL-SCNC: 99 MMOL/L (ref 98–107)
CO2 SERPL-SCNC: 25 MMOL/L (ref 22–29)
CREAT SERPL-MCNC: 0.86 MG/DL (ref 0.76–1.27)
DEPRECATED RDW RBC AUTO: 47.6 FL (ref 37–54)
EGFRCR SERPLBLD CKD-EPI 2021: 101.6 ML/MIN/1.73
EOSINOPHIL # BLD MANUAL: 0.15 10*3/MM3 (ref 0–0.4)
EOSINOPHIL NFR BLD MANUAL: 1.9 % (ref 0.3–6.2)
ERYTHROCYTE [DISTWIDTH] IN BLOOD BY AUTOMATED COUNT: 18 % (ref 12.3–15.4)
GLOBULIN UR ELPH-MCNC: 3 GM/DL
GLUCOSE SERPL-MCNC: 143 MG/DL (ref 65–99)
HCT VFR BLD AUTO: 50.6 % (ref 37.5–51)
HGB BLD-MCNC: 16.1 G/DL (ref 13–17.7)
LYMPHOCYTES # BLD MANUAL: 2.73 10*3/MM3 (ref 0.7–3.1)
LYMPHOCYTES NFR BLD MANUAL: 2.8 % (ref 5–12)
MCH RBC QN AUTO: 25.7 PG (ref 26.6–33)
MCHC RBC AUTO-ENTMCNC: 31.8 G/DL (ref 31.5–35.7)
MCV RBC AUTO: 80.8 FL (ref 79–97)
MONOCYTES # BLD: 0.22 10*3/MM3 (ref 0.1–0.9)
NEUTROPHILS # BLD AUTO: 4.8 10*3/MM3 (ref 1.7–7)
NEUTROPHILS NFR BLD MANUAL: 56.1 % (ref 42.7–76)
NEUTS BAND NFR BLD MANUAL: 4.7 % (ref 0–5)
PLAT MORPH BLD: NORMAL
PLATELET # BLD AUTO: 270 10*3/MM3 (ref 140–450)
PMV BLD AUTO: 9.9 FL (ref 6–12)
POLYCHROMASIA BLD QL SMEAR: ABNORMAL
POTASSIUM SERPL-SCNC: 4.1 MMOL/L (ref 3.5–5.2)
PROT SERPL-MCNC: 6.8 G/DL (ref 6–8.5)
RBC # BLD AUTO: 6.26 10*6/MM3 (ref 4.14–5.8)
SODIUM SERPL-SCNC: 134 MMOL/L (ref 136–145)
VARIANT LYMPHS NFR BLD MANUAL: 11.2 % (ref 0–5)
VARIANT LYMPHS NFR BLD MANUAL: 23.4 % (ref 19.6–45.3)
WBC MORPH BLD: NORMAL
WBC NRBC COR # BLD AUTO: 7.9 10*3/MM3 (ref 3.4–10.8)

## 2024-06-01 PROCEDURE — 25010000002 FUROSEMIDE PER 20 MG: Performed by: INTERNAL MEDICINE

## 2024-06-01 PROCEDURE — 85007 BL SMEAR W/DIFF WBC COUNT: CPT | Performed by: FAMILY MEDICINE

## 2024-06-01 PROCEDURE — 25010000002 ENOXAPARIN PER 10 MG: Performed by: INTERNAL MEDICINE

## 2024-06-01 PROCEDURE — 85025 COMPLETE CBC W/AUTO DIFF WBC: CPT | Performed by: FAMILY MEDICINE

## 2024-06-01 PROCEDURE — 80053 COMPREHEN METABOLIC PANEL: CPT | Performed by: FAMILY MEDICINE

## 2024-06-01 PROCEDURE — 99232 SBSQ HOSP IP/OBS MODERATE 35: CPT | Performed by: INTERNAL MEDICINE

## 2024-06-01 RX ADMIN — CARVEDILOL 12.5 MG: 6.25 TABLET, FILM COATED ORAL at 18:22

## 2024-06-01 RX ADMIN — LISINOPRIL 40 MG: 20 TABLET ORAL at 09:59

## 2024-06-01 RX ADMIN — FUROSEMIDE 40 MG: 10 INJECTION, SOLUTION INTRAMUSCULAR; INTRAVENOUS at 18:22

## 2024-06-01 RX ADMIN — ENOXAPARIN SODIUM 80 MG: 100 INJECTION SUBCUTANEOUS at 18:21

## 2024-06-01 RX ADMIN — ATORVASTATIN CALCIUM 40 MG: 40 TABLET ORAL at 20:44

## 2024-06-01 RX ADMIN — CLOPIDOGREL BISULFATE 75 MG: 75 TABLET, FILM COATED ORAL at 18:22

## 2024-06-01 RX ADMIN — ASPIRIN 81 MG: 81 TABLET, COATED ORAL at 09:58

## 2024-06-01 RX ADMIN — CARVEDILOL 12.5 MG: 6.25 TABLET, FILM COATED ORAL at 09:59

## 2024-06-01 RX ADMIN — AMLODIPINE BESYLATE 10 MG: 10 TABLET ORAL at 09:59

## 2024-06-01 RX ADMIN — FUROSEMIDE 40 MG: 10 INJECTION, SOLUTION INTRAMUSCULAR; INTRAVENOUS at 06:27

## 2024-06-01 RX ADMIN — ENOXAPARIN SODIUM 80 MG: 100 INJECTION SUBCUTANEOUS at 06:27

## 2024-06-01 RX ADMIN — SERTRALINE 150 MG: 50 TABLET, FILM COATED ORAL at 09:58

## 2024-06-01 NOTE — PLAN OF CARE
Goal Outcome Evaluation:      PT VSS, A&O, and s 60-72 on tele. PT appeared to rest well overnight and call light within reach.

## 2024-06-01 NOTE — PROGRESS NOTES
[facility] -  Progress Note     LOS: 2 days   Patient Care Team:  Daniel Mckeon MD as PCP - General (Sports Medicine)  Ramiro Patel DO as Consulting Physician (Gastroenterology)    Chief Complaint: F/U HTN, NSTEMI    Subjective   Resting in bed. Denies any complaint of chest pain or shortness of breath. Complaining of fatigue.       REVIEW OF SYSTEMS:  Constitutional: Denies fever or chills. + fatigue  HEENT: Denies headaches or visual disturbances. Denies difficulty swallowing or sore throat.  Respiratory: Denies cough or hoarseness. Denies shortness of breath.   Cardiovascular: Denies chest pain or pressure. Denies palpitations. Denies presyncope/syncope. Denies orthopnea/PND. Denies lower extremity edema.   Gastrointestinal: Denies abdominal pain. Denies nausea/vomiting. Denies change in bowel habits or history of recent GI tract blood loss.   Genitourinary: Denies urinary urgency or frequency. Denies dysuria, hematuria.  Musculoskeletal: Denies pain or swelling in joints.  Neurological: Denies paresthesias. Denies headache. Denies seizure or stroke symptoms.  Behavioral/Psych: Denies problems with anxiety or depression.    All other systems are negative except where stated above.      Objective     Vital Sign Min/Max for last 24 hours  Temp  Min: 97.3 °F (36.3 °C)  Max: 98.6 °F (37 °C)   BP  Min: 108/80  Max: 143/99   Pulse  Min: 67  Max: 83   Resp  Min: 14  Max: 18   SpO2  Min: 91 %  Max: 97 %   No data recorded   Weight  Min: 74.6 kg (164 lb 6.4 oz)  Max: 74.6 kg (164 lb 6.4 oz)         05/31/24 2048   Weight: 74.6 kg (164 lb 6.4 oz)         Intake/Output Summary (Last 24 hours) at 6/1/2024 0940  Last data filed at 6/1/2024 0855  Gross per 24 hour   Intake 600 ml   Output 450 ml   Net 150 ml         Physical Exam:    General Appearance:  Alert, cooperative, in no acute distress. Appears older than stated age.   HEENT:  Normocephalic. Atraumatic. MICHAEL.    Lungs:  Clear to  auscultation, respirations regular, even and unlabored    Heart:  Regular rhythm and normal rate, normal S1 and S2, no murmur, no gallop, no rub, no click   Abdomen:  Normal bowel sounds, soft,  non-tender, non-distended   Extremities:  Moves all extremities, no edema, no cyanosis, no redness   Pulses:  Pulses palpable and equal bilaterally   Skin:  No bleeding, bruising or rash   Neurologic:  Grossly intact       Results Review:   Lab Results (last 72 hours)       Procedure Component Value Units Date/Time    Basic Metabolic Panel [[id number]]  (Abnormal) Collected: 05/31/24 0226    Specimen: Blood Updated: 05/31/24 0314     Glucose 161 mg/dL      BUN 10 mg/dL      Creatinine 0.90 mg/dL      Sodium 136 mmol/L      Potassium 3.8 mmol/L      Chloride 100 mmol/L      CO2 26.0 mmol/L      Calcium 9.0 mg/dL      BUN/Creatinine Ratio 11.1     Anion Gap 10.0 mmol/L      eGFR 100.2 mL/min/1.73     Narrative:      GFR Normal >60  Chronic Kidney Disease <60  Kidney Failure <15      Lipid Panel [[id number]]  (Abnormal) Collected: 05/31/24 0226    Specimen: Blood Updated: 05/31/24 0314     Total Cholesterol 123 mg/dL      Triglycerides 198 mg/dL      HDL Cholesterol 30 mg/dL      LDL Cholesterol  60 mg/dL      VLDL Cholesterol 33 mg/dL      LDL/HDL Ratio 1.78    Narrative:      Cholesterol Reference Ranges  (U.S. Department of Health and Human Services ATP III Classifications)    Desirable          <200 mg/dL  Borderline High    200-239 mg/dL  High Risk          >240 mg/dL      Triglyceride Reference Ranges  (U.S. Department of Health and Human Services ATP III Classifications)    Normal           <150 mg/dL  Borderline High  150-199 mg/dL  High             200-499 mg/dL  Very High        >500 mg/dL    HDL Reference Ranges  (U.S. Department of Health and Human Services ATP III Classifications)    Low     <40 mg/dl (major risk factor for CHD)  High    >60 mg/dl ('negative' risk factor for CHD)        LDL Reference Ranges  ([place].  Department of Health and Human Services ATP III Classifications)    Optimal          <100 mg/dL  Near Optimal     100-129 mg/dL  Borderline High  130-159 mg/dL  High             160-189 mg/dL  Very High        >189 mg/dL    Hemoglobin A1c [[id number]]  (Abnormal) Collected: 05/31/24 0226    Specimen: Blood Updated: 05/31/24 0303     Hemoglobin A1C 6.90 %     Narrative:      Hemoglobin A1C Ranges:    Increased Risk for Diabetes  5.7% to 6.4%  Diabetes                     >= 6.5%  Diabetic Goal                < 7.0%    CBC (No Diff) [[id number]]  (Abnormal) Collected: 05/31/24 0226    Specimen: Blood Updated: 05/31/24 0255     WBC 8.76 10*3/mm3      RBC 6.41 10*6/mm3      Hemoglobin 16.0 g/dL      Hematocrit 52.2 %      MCV 81.4 fL      MCH 25.0 pg      MCHC 30.7 g/dL      RDW 18.3 %      RDW-SD 48.6 fl      MPV 10.2 fL      Platelets 252 10*3/mm3     High Sensitivity Troponin T 2Hr [[id number]]  (Abnormal) Collected: 05/30/24 1218    Specimen: Blood Updated: 05/30/24 1300     HS Troponin T 333 ng/L      Troponin T Delta 39 ng/L     Narrative:      High Sensitive Troponin T Reference Range:  <14.0 ng/L- Negative Female for AMI  <22.0 ng/L- Negative Male for AMI  >=14 - Abnormal Female indicating possible myocardial injury.  >=22 - Abnormal Male indicating possible myocardial injury.   Clinicians would have to utilize clinical acumen, EKG, Troponin, and serial changes to determine if it is an Acute Myocardial Infarction or myocardial injury due to an underlying chronic condition.         Fentanyl, Urine - Urine, Clean Catch [[id number]]  (Normal) Collected: 05/30/24 1053    Specimen: Urine, Clean Catch Updated: 05/30/24 1127     Fentanyl, Urine Negative    Narrative:      Negative Threshold:      Fentanyl 5 ng/mL     The normal value for the drug tested is negative. This report includes final unconfirmed screening results to be used for medical treatment purposes only. Unconfirmed results must not be used for non-medical  purposes such as employment or legal testing. Clinical consideration should be applied to any drug of abuse test, particularly when unconfirmed results are used.           Urine Drug Screen - Urine, Clean Catch [[id number]]  (Normal) Collected: 05/30/24 1053    Specimen: Urine, Clean Catch Updated: 05/30/24 1116     THC, Screen, Urine Negative     Phencyclidine (PCP), Urine Negative     Cocaine Screen, Urine Negative     Methamphetamine, Ur Negative     Opiate Screen Negative     Amphetamine Screen, Urine Negative     Benzodiazepine Screen, Urine Negative     Tricyclic Antidepressants Screen Negative     Methadone Screen, Urine Negative     Barbiturates Screen, Urine Negative     Oxycodone Screen, Urine Negative     Buprenorphine, Screen, Urine Negative    Narrative:      Cutoff For Drugs Screened:    Amphetamines               500 ng/ml  Barbiturates               200 ng/ml  Benzodiazepines            150 ng/ml  Cocaine                    150 ng/ml  Methadone                  200 ng/ml  Opiates                    100 ng/ml  Phencyclidine               25 ng/ml  THC                         50 ng/ml  Methamphetamine            500 ng/ml  Tricyclic Antidepressants  300 ng/ml  Oxycodone                  100 ng/ml  Buprenorphine               10 ng/ml    The normal value for all drugs tested is negative. This report includes unconfirmed screening results, with the cutoff values listed, to be used for medical treatment purposes only.  Unconfirmed results must not be used for non-medical purposes such as employment or legal testing.  Clinical consideration should be applied to any drug of abuse test, particularly when unconfirmed results are used.      [place] Draw [[id number]] Collected: 05/30/24 0930    Specimen: Blood Updated: 05/30/24 1017    Narrative:      The following orders were created for panel order [place] [facility].  Procedure                               Abnormality         Status                     ---------                                -----------         ------                     Green Top (Gel)[[id number]]                                  Final result               Lavender Top[[id number]]                                     Final result               Red Top[[id number]]                                                                     Light Blue Top[[id number]]                                   Final result                 Please view results for these tests on the individual orders.    Comprehensive Metabolic Panel [[id number]]  (Abnormal) Collected: 05/30/24 0930    Specimen: Blood Updated: 05/30/24 1007     Glucose 132 mg/dL      BUN 4 mg/dL      Creatinine 0.69 mg/dL      Sodium 138 mmol/L      Potassium 4.6 mmol/L      Comment: Slight hemolysis detected by analyzer. Result may be falsely elevated.        Chloride 102 mmol/L      CO2 27.0 mmol/L      Calcium 8.9 mg/dL      Total Protein 7.8 g/dL      Albumin 4.3 g/dL      ALT (SGPT) 24 U/L      AST (SGOT) 39 U/L      Comment: Slight hemolysis detected by analyzer. Result may be falsely elevated.        Alkaline Phosphatase 100 U/L      Total Bilirubin 0.4 mg/dL      Globulin 3.5 gm/dL      A/G Ratio 1.2 g/dL      BUN/Creatinine Ratio 5.8     Anion Gap 9.0 mmol/L      eGFR 108.6 mL/min/1.73     Narrative:      GFR Normal >60  Chronic Kidney Disease <60  Kidney Failure <15      High Sensitivity Troponin T [[id number]]  (Abnormal) Collected: 05/30/24 0930    Specimen: Blood Updated: 05/30/24 1001     HS Troponin T 294 ng/L     Narrative:      High Sensitive Troponin T Reference Range:  <14.0 ng/L- Negative Female for AMI  <22.0 ng/L- Negative Male for AMI  >=14 - Abnormal Female indicating possible myocardial injury.  >=22 - Abnormal Male indicating possible myocardial injury.   Clinicians would have to utilize clinical acumen, EKG, Troponin, and serial changes to determine if it is an Acute Myocardial Infarction or myocardial injury due to an underlying chronic  "condition.         BNP [[id number]]  (Abnormal) Collected: 05/30/24 0930    Specimen: Blood Updated: 05/30/24 0959     proBNP 1,863.0 pg/mL     Narrative:      This assay is used as an aid in the diagnosis of individuals suspected of having heart failure. It can be used as an aid in the diagnosis of acute decompensated heart failure (ADHF) in patients presenting with signs and symptoms of ADHF to the emergency department (ED). In addition, NT-proBNP of <300 pg/mL indicates ADHF is not likely.    Age Range Result Interpretation  NT-proBNP Concentration (pg/mL:      <50             Positive            >450                   Gray                 300-450                    Negative             <300    50-75           Positive            >900                  Gray                300-900                  Negative            <300      >75             Positive            >1800                  Gray                300-1800                  Negative            <300    D-dimer, Quantitative [[id number]]  (Normal) Collected: 05/30/24 0930    Specimen: Blood Updated: 05/30/24 0957     D-Dimer, Quantitative <0.27 MCGFEU/mL     Narrative:      According to the assay 's published package insert, a normal (<0.50 MCGFEU/mL) D-dimer result in conjunction with a non-high clinical probability assessment, excludes deep vein thrombosis (DVT) and pulmonary embolism (PE) with high sensitivity.    D-dimer values increase with age and this can make VTE exclusion of an older population difficult. To address this, the [employer], based on best available evidence and recent guidelines, recommends that clinicians use age-adjusted D-dimer thresholds in patients greater than 50 years of age with: a) a low probability of PE who do not meet all Pulmonary Embolism Rule Out Criteria, or b) in those with intermediate probability of PE.   The formula for an age-adjusted D-dimer cut-off is \"age/100\".  For example, a 60 year " old patient would have an age-adjusted cut-off of 0.60 MCGFEU/mL and an 80 year old 0.80 MCGFEU/mL.    Green Top (Gel) [[id number]] Collected: 05/30/24 0930    Specimen: Blood Updated: 05/30/24 0945     Extra Tube Hold for add-ons.     Comment: Auto resulted.       Lavender Top [[id number]] Collected: 05/30/24 0930    Specimen: Blood Updated: 05/30/24 0945     Extra Tube hold for add-on     Comment: Auto resulted       Light Blue Top [[id number]] Collected: 05/30/24 0930    Specimen: Blood Updated: 05/30/24 0945     Extra Tube Hold for add-ons.     Comment: Auto resulted       CBC & Differential [[id number]]  (Abnormal) Collected: 05/30/24 0930    Specimen: Blood Updated: 05/30/24 0944    Narrative:      The following orders were created for panel order CBC & Differential.  Procedure                               Abnormality         Status                     ---------                               -----------         ------                     CBC Auto Differential[[id number]]        Abnormal            Final result                 Please view results for these tests on the individual orders.    CBC Auto Differential [[id number]]  (Abnormal) Collected: 05/30/24 0930    Specimen: Blood Updated: 05/30/24 0944     WBC 8.16 10*3/mm3      RBC 6.77 10*6/mm3      Hemoglobin 16.8 g/dL      Hematocrit 55.2 %      MCV 81.5 fL      MCH 24.8 pg      MCHC 30.4 g/dL      RDW 18.0 %      RDW-SD 49.1 fl      MPV 9.6 fL      Platelets 277 10*3/mm3      Neutrophil % 63.9 %      Lymphocyte % 23.3 %      Monocyte % 10.8 %      Eosinophil % 0.7 %      Basophil % 0.9 %      Immature Grans % 0.4 %      Neutrophils, Absolute 5.22 10*3/mm3      Lymphocytes, Absolute 1.90 10*3/mm3      Monocytes, Absolute 0.88 10*3/mm3      Eosinophils, Absolute 0.06 10*3/mm3      Basophils, Absolute 0.07 10*3/mm3      Immature Grans, Absolute 0.03 10*3/mm3      nRBC 0.0 /100 WBC               2D Echocardiogram:    Left ventricular systolic function is normal.  Left ventricular ejection fraction appears to be 61 - 65%.    Left ventricular wall thickness is consistent with mild concentric hypertrophy.    Left ventricular diastolic function is consistent with (grade I) impaired relaxation.    Normal right ventricular cavity size and systolic function noted.    There is no significant (greater than mild) valvular dysfunction.      Medication Review: yes  Current Facility-Administered Medications   Medication Dose Route Frequency Provider Last Rate Last Admin    acetaminophen (TYLENOL) tablet 650 mg  650 mg Oral Q4H PRN Andrez Abrams MD        amLODIPine (NORVASC) tablet 10 mg  10 mg Oral Q24H Andrez Abrams MD   10 mg at 05/31/24 0843    And    lisinopril (PRINIVIL,ZESTRIL) tablet 40 mg  40 mg Oral Q24H Andrez Abrams MD   40 mg at 05/31/24 0844    aspirin EC tablet 81 mg  81 mg Oral Daily Andrez Abrams MD   81 mg at 05/31/24 0843    atorvastatin (LIPITOR) tablet 40 mg  40 mg Oral Nightly Steve Horton MD   40 mg at 05/31/24 2113    carvedilol (COREG) tablet 12.5 mg  12.5 mg Oral BID With Meals Steve Horton MD   12.5 mg at 05/31/24 1812    clopidogrel (PLAVIX) tablet 75 mg  75 mg Oral Daily Andrez Abrams MD   75 mg at 05/31/24 0843    Enoxaparin Sodium (LOVENOX) syringe 80 mg  1 mg/kg Subcutaneous Q12H Andrez Abrams MD   80 mg at 06/01/24 0627    furosemide (LASIX) injection 40 mg  40 mg Intravenous Q12H Andrez Abrams MD   40 mg at 06/01/24 0627    hydrALAZINE (APRESOLINE) injection 10 mg  10 mg Intravenous Q6H PRN Andrez Abrams MD   10 mg at 05/31/24 0343    mupirocin (BACTROBAN) 2 % nasal ointment 1 Application  1 application  Each Nare BID Steve Horton MD   1 Application at 05/31/24 2113    ondansetron ODT (ZOFRAN-ODT) disintegrating tablet 4 mg  4 mg Oral Q6H PRN Andrez Abrams MD        Or    ondansetron (ZOFRAN) injection 4 mg  4 mg Intravenous Q6H PRN Andrez Abrams  MD Rigo        sertraline (ZOLOFT) tablet 150 mg  150 mg Oral Daily Andrez Abrams MD   150 mg at 05/31/24 0844    sodium chloride 0.9 % flush 10 mL  10 mL Intravenous PRN Andrez Abrams MD        sodium chloride 0.9 % flush 10 mL  10 mL Intravenous PRN Andrez Abrams MD             Assessment & Plan       Non-STEMI (non-ST elevated myocardial infarction) 2' to coronary artery dissection and hypertensive urgency - patient denies any chest pain. On ASA 81 mg, Plavix 75 mg daily, carvedilol 12.5 mg BID, Lotrel 10-40 mg daily, and atorvastatin 40 mg nightly    Hypertensive urgency - BP better controlled. No prn medication received since 05/31/2024 at 0330. Continue carvedilol 12.5 mg BID and Lotrel 10-40 mg daily.     Acute HFpEF with elevated LVEDP on LHC - patient appears compensated. No accurate I/O. Weight down 6 pounds. Continue IV lasix 40 mg q12hr. Will check BMP in AM.     Pulmonary nodule, LLL - will need follow-up with PCP    Tobacco abuse - encouraged smoking cessation    Polycythemia, likely 2' to chronic cigarette smoking        Jennifer Mcpherson, DIVYA  06/01/24  09:40 CDT

## 2024-06-01 NOTE — SIGNIFICANT NOTE
Discussed Mr. Smart with on call provider for Dr. Daniel Aaron who accepted patient for transfer to the floor.

## 2024-06-01 NOTE — PROGRESS NOTES
Daily Progress Note  Lilia Smart  MRN: [id number] LOS: 2    Admit Date: 5/30/2024 6/1/2024 09:00 CDT    Subjective:      Chief Complaint:  Chief Complaint   Patient presents with    Hypertension       Interval History:    Reviewed overnight events and nursing notes.   No acute events overnight.  Chest pain has resolved.  Blood pressure is much better controlled.  He does still feel very fatigued.    Review of Systems   Constitutional:  Positive for activity change and fatigue. Negative for fever.   Respiratory:  Negative for shortness of breath.    Cardiovascular:  Positive for chest pain. Negative for palpitations.   Gastrointestinal:  Negative for abdominal pain, nausea and vomiting.   Skin:  Negative for color change.   Neurological:  Positive for weakness.       DIET:  Diet: Cardiac; Healthy Heart (2-3 Na+); Fluid Consistency: Thin (IDDSI 0)    Medications:      amLODIPine, 10 mg, Oral, Q24H   And  lisinopril, 40 mg, Oral, Q24H  aspirin, 81 mg, Oral, Daily  atorvastatin, 40 mg, Oral, Nightly  carvedilol, 12.5 mg, Oral, BID With Meals  clopidogrel, 75 mg, Oral, Daily  enoxaparin, 1 mg/kg, Subcutaneous, Q12H  furosemide, 40 mg, Intravenous, Q12H  mupirocin, 1 application , Each Nare, BID  sertraline, 150 mg, Oral, Daily        Data:     Code Status:   Code Status and Medical Interventions:   Ordered at: 05/30/24 1327     Code Status (Patient has no pulse and is not breathing):    CPR (Attempt to Resuscitate)     Medical Interventions (Patient has pulse or is breathing):    Full Support       Family History   Problem Relation Age of Onset    Hypertension Mother     Diabetes Mother     Stroke Mother     Coronary artery disease Mother     Hypertension Father      Social History     Socioeconomic History    Marital status:    Tobacco Use    Smoking status: Every Day     Types: Cigars    Smokeless tobacco: Never   Vaping Use    Vaping status: Never Used   Substance and Sexual Activity    Alcohol  use: Not Currently     Comment: 3-4 beers per day    Drug use: Never       Labs:    Lab Results (last 72 hours)       Procedure Component Value Units Date/Time    Basic Metabolic Panel [[id number]]  (Abnormal) Collected: 05/31/24 0226    Specimen: Blood Updated: 05/31/24 0314     Glucose 161 mg/dL      BUN 10 mg/dL      Creatinine 0.90 mg/dL      Sodium 136 mmol/L      Potassium 3.8 mmol/L      Chloride 100 mmol/L      CO2 26.0 mmol/L      Calcium 9.0 mg/dL      BUN/Creatinine Ratio 11.1     Anion Gap 10.0 mmol/L      eGFR 100.2 mL/min/1.73     Narrative:      GFR Normal >60  Chronic Kidney Disease <60  Kidney Failure <15      Lipid Panel [[id number]]  (Abnormal) Collected: 05/31/24 0226    Specimen: Blood Updated: 05/31/24 0314     Total Cholesterol 123 mg/dL      Triglycerides 198 mg/dL      HDL Cholesterol 30 mg/dL      LDL Cholesterol  60 mg/dL      VLDL Cholesterol 33 mg/dL      LDL/HDL Ratio 1.78    Narrative:      Cholesterol Reference Ranges  (U.S. Department of Health and Human Services ATP III Classifications)    Desirable          <200 mg/dL  Borderline High    200-239 mg/dL  High Risk          >240 mg/dL      Triglyceride Reference Ranges  (U.S. Department of Health and Human Services ATP III Classifications)    Normal           <150 mg/dL  Borderline High  150-199 mg/dL  High             200-499 mg/dL  Very High        >500 mg/dL    HDL Reference Ranges  (U.S. Department of Health and Human Services ATP III Classifications)    Low     <40 mg/dl (major risk factor for CHD)  High    >60 mg/dl ('negative' risk factor for CHD)        LDL Reference Ranges  (U.S. Department of Health and Human Services ATP III Classifications)    Optimal          <100 mg/dL  Near Optimal     100-129 mg/dL  Borderline High  130-159 mg/dL  High             160-189 mg/dL  Very High        >189 mg/dL    Hemoglobin A1c [[id number]]  (Abnormal) Collected: 05/31/24 0226    Specimen: Blood Updated: 05/31/24 0303     Hemoglobin A1C 6.90  %     Narrative:      Hemoglobin A1C Ranges:    Increased Risk for Diabetes  5.7% to 6.4%  Diabetes                     >= 6.5%  Diabetic Goal                < 7.0%    CBC (No Diff) [[id number]]  (Abnormal) Collected: 05/31/24 0226    Specimen: Blood Updated: 05/31/24 0255     WBC 8.76 10*3/mm3      RBC 6.41 10*6/mm3      Hemoglobin 16.0 g/dL      Hematocrit 52.2 %      MCV 81.4 fL      MCH 25.0 pg      MCHC 30.7 g/dL      RDW 18.3 %      RDW-SD 48.6 fl      MPV 10.2 fL      Platelets 252 10*3/mm3     High Sensitivity Troponin T 2Hr [[id number]]  (Abnormal) Collected: 05/30/24 1218    Specimen: Blood Updated: 05/30/24 1300     HS Troponin T 333 ng/L      Troponin T Delta 39 ng/L     Narrative:      High Sensitive Troponin T Reference Range:  <14.0 ng/L- Negative Female for AMI  <22.0 ng/L- Negative Male for AMI  >=14 - Abnormal Female indicating possible myocardial injury.  >=22 - Abnormal Male indicating possible myocardial injury.   Clinicians would have to utilize clinical acumen, EKG, Troponin, and serial changes to determine if it is an Acute Myocardial Infarction or myocardial injury due to an underlying chronic condition.         Fentanyl, Urine - Urine, Clean Catch [[id number]]  (Normal) Collected: 05/30/24 1053    Specimen: Urine, Clean Catch Updated: 05/30/24 1127     Fentanyl, Urine Negative    Narrative:      Negative Threshold:      Fentanyl 5 ng/mL     The normal value for the drug tested is negative. This report includes final unconfirmed screening results to be used for medical treatment purposes only. Unconfirmed results must not be used for non-medical purposes such as employment or legal testing. Clinical consideration should be applied to any drug of abuse test, particularly when unconfirmed results are used.           Urine Drug Screen - Urine, Clean Catch [[id number]]  (Normal) Collected: 05/30/24 1053    Specimen: Urine, Clean Catch Updated: 05/30/24 1116     THC, Screen, Urine Negative      Phencyclidine (PCP), Urine Negative     Cocaine Screen, Urine Negative     Methamphetamine, Ur Negative     Opiate Screen Negative     Amphetamine Screen, Urine Negative     Benzodiazepine Screen, Urine Negative     Tricyclic Antidepressants Screen Negative     Methadone Screen, Urine Negative     Barbiturates Screen, Urine Negative     Oxycodone Screen, Urine Negative     Buprenorphine, Screen, Urine Negative    Narrative:      Cutoff For Drugs Screened:    Amphetamines               500 ng/ml  Barbiturates               200 ng/ml  Benzodiazepines            150 ng/ml  Cocaine                    150 ng/ml  Methadone                  200 ng/ml  Opiates                    100 ng/ml  Phencyclidine               25 ng/ml  THC                         50 ng/ml  Methamphetamine            500 ng/ml  Tricyclic Antidepressants  300 ng/ml  Oxycodone                  100 ng/ml  Buprenorphine               10 ng/ml    The normal value for all drugs tested is negative. This report includes unconfirmed screening results, with the cutoff values listed, to be used for medical treatment purposes only.  Unconfirmed results must not be used for non-medical purposes such as employment or legal testing.  Clinical consideration should be applied to any drug of abuse test, particularly when unconfirmed results are used.      [place] Draw [[id number]] Collected: 05/30/24 0930    Specimen: Blood Updated: 05/30/24 1017    Narrative:      The following orders were created for panel order [place] Draw.  Procedure                               Abnormality         Status                     ---------                               -----------         ------                     Green Top (Gel)[[id number]]                                  Final result               Lavender Top[[id number]]                                     Final result               Red Top[[id number]]                                                                     Light Blue  Top[[id number]]                                   Final result                 Please view results for these tests on the individual orders.    Comprehensive Metabolic Panel [[id number]]  (Abnormal) Collected: 05/30/24 0930    Specimen: Blood Updated: 05/30/24 1007     Glucose 132 mg/dL      BUN 4 mg/dL      Creatinine 0.69 mg/dL      Sodium 138 mmol/L      Potassium 4.6 mmol/L      Comment: Slight hemolysis detected by analyzer. Result may be falsely elevated.        Chloride 102 mmol/L      CO2 27.0 mmol/L      Calcium 8.9 mg/dL      Total Protein 7.8 g/dL      Albumin 4.3 g/dL      ALT (SGPT) 24 U/L      AST (SGOT) 39 U/L      Comment: Slight hemolysis detected by analyzer. Result may be falsely elevated.        Alkaline Phosphatase 100 U/L      Total Bilirubin 0.4 mg/dL      Globulin 3.5 gm/dL      A/G Ratio 1.2 g/dL      BUN/Creatinine Ratio 5.8     Anion Gap 9.0 mmol/L      eGFR 108.6 mL/min/1.73     Narrative:      GFR Normal >60  Chronic Kidney Disease <60  Kidney Failure <15      High Sensitivity Troponin T [[id number]]  (Abnormal) Collected: 05/30/24 0930    Specimen: Blood Updated: 05/30/24 1001     HS Troponin T 294 ng/L     Narrative:      High Sensitive Troponin T Reference Range:  <14.0 ng/L- Negative Female for AMI  <22.0 ng/L- Negative Male for AMI  >=14 - Abnormal Female indicating possible myocardial injury.  >=22 - Abnormal Male indicating possible myocardial injury.   Clinicians would have to utilize clinical acumen, EKG, Troponin, and serial changes to determine if it is an Acute Myocardial Infarction or myocardial injury due to an underlying chronic condition.         BNP [[id number]]  (Abnormal) Collected: 05/30/24 0930    Specimen: Blood Updated: 05/30/24 0959     proBNP 1,863.0 pg/mL     Narrative:      This assay is used as an aid in the diagnosis of individuals suspected of having heart failure. It can be used as an aid in the diagnosis of acute decompensated heart failure (ADHF) in patients  "presenting with signs and symptoms of ADHF to the emergency department (ED). In addition, NT-proBNP of <300 pg/mL indicates ADHF is not likely.    Age Range Result Interpretation  NT-proBNP Concentration (pg/mL:      <50             Positive            >450                   Gray                 300-450                    Negative             <300    50-75           Positive            >900                  Gray                300-900                  Negative            <300      >75             Positive            >1800                  Gray                300-1800                  Negative            <300    D-dimer, Quantitative [[id number]]  (Normal) Collected: 05/30/24 0930    Specimen: Blood Updated: 05/30/24 0957     D-Dimer, Quantitative <0.27 MCGFEU/mL     Narrative:      According to the assay 's published package insert, a normal (<0.50 MCGFEU/mL) D-dimer result in conjunction with a non-high clinical probability assessment, excludes deep vein thrombosis (DVT) and pulmonary embolism (PE) with high sensitivity.    D-dimer values increase with age and this can make VTE exclusion of an older population difficult. To address this, the [employer], based on best available evidence and recent guidelines, recommends that clinicians use age-adjusted D-dimer thresholds in patients greater than 50 years of age with: a) a low probability of PE who do not meet all Pulmonary Embolism Rule Out Criteria, or b) in those with intermediate probability of PE.   The formula for an age-adjusted D-dimer cut-off is \"age/100\".  For example, a 60 year old patient would have an age-adjusted cut-off of 0.60 MCGFEU/mL and an 80 year old 0.80 MCGFEU/mL.    Green Top (Gel) [[id number]] Collected: 05/30/24 0930    Specimen: Blood Updated: 05/30/24 0945     Extra Tube Hold for add-ons.     Comment: Auto resulted.       Lavender Top [[id number]] Collected: 05/30/24 0930    Specimen: Blood Updated: 05/30/24 " "945     Extra Tube hold for add-on     Comment: Auto resulted       Light Blue Top [[id number]] Collected: 24    Specimen: Blood Updated: 24     Extra Tube Hold for add-ons.     Comment: Auto resulted       CBC & Differential [[id number]]  (Abnormal) Collected: 24    Specimen: Blood Updated: 24    Narrative:      The following orders were created for panel order CBC & Differential.  Procedure                               Abnormality         Status                     ---------                               -----------         ------                     CBC Auto Differential[[id number]]        Abnormal            Final result                 Please view results for these tests on the individual orders.    CBC Auto Differential [[id number]]  (Abnormal) Collected: 24    Specimen: Blood Updated: 24     WBC 8.16 10*3/mm3      RBC 6.77 10*6/mm3      Hemoglobin 16.8 g/dL      Hematocrit 55.2 %      MCV 81.5 fL      MCH 24.8 pg      MCHC 30.4 g/dL      RDW 18.0 %      RDW-SD 49.1 fl      MPV 9.6 fL      Platelets 277 10*3/mm3      Neutrophil % 63.9 %      Lymphocyte % 23.3 %      Monocyte % 10.8 %      Eosinophil % 0.7 %      Basophil % 0.9 %      Immature Grans % 0.4 %      Neutrophils, Absolute 5.22 10*3/mm3      Lymphocytes, Absolute 1.90 10*3/mm3      Monocytes, Absolute 0.88 10*3/mm3      Eosinophils, Absolute 0.06 10*3/mm3      Basophils, Absolute 0.07 10*3/mm3      Immature Grans, Absolute 0.03 10*3/mm3      nRBC 0.0 /100 WBC               Objective:     Vitals: /80 (BP Location: Left arm, Patient Position: Lying)   Pulse 67   Temp 97.9 °F (36.6 °C) (Oral)   Resp 16   Ht 152.4 cm (60\")   Wt 74.6 kg (164 lb 6.4 oz)   SpO2 95%   BMI 32.11 kg/m²    Intake/Output Summary (Last 24 hours) at 2024 0900  Last data filed at 2024 0855  Gross per 24 hour   Intake 600 ml   Output 450 ml   Net 150 ml    Temp (24hrs), Av.1 °F (36.7 °C), " Min:97.3 °F (36.3 °C), Max:98.6 °F (37 °C)      Physical Exam  Vitals reviewed.   Constitutional:       Appearance: Normal appearance.   HENT:      Head: Normocephalic and atraumatic.   Eyes:      General:         Right eye: No discharge.         Left eye: No discharge.      Extraocular Movements: Extraocular movements intact.      Conjunctiva/sclera: Conjunctivae normal.   Cardiovascular:      Rate and Rhythm: Normal rate and regular rhythm.      Pulses: Normal pulses.      Heart sounds: No murmur heard.  Pulmonary:      Effort: Pulmonary effort is normal. No respiratory distress.      Breath sounds: No wheezing.   Abdominal:      General: Abdomen is flat. Bowel sounds are normal. There is no distension.      Tenderness: There is no abdominal tenderness.   Skin:     Capillary Refill: Capillary refill takes less than 2 seconds.   Neurological:      General: No focal deficit present.      Mental Status: He is alert and oriented to person, place, and time.   Psychiatric:         Mood and Affect: Mood normal.             Assessment and Plan:     Primary Problem:  Non-STEMI (non-ST elevated myocardial infarction)    Hospital Problem list:    Non-STEMI (non-ST elevated myocardial infarction)    NSTEMI, initial episode of care    Hypertensive emergency      PMH:  Past Medical History:   Diagnosis Date    Hypertension        Treatment Plan:  NSTEMI: Cardiology following, performed coronary angiography 5/30/2024 dissection of circumflex artery noted but no intervention required.  Suspected secondary to hypertensive emergency as below.  Transferred to the floor from CCU 5/31/2024.  - Chest pain has resolved.    Hypertensive emergency: BP now stable off Cardene.  Continue amlodipine, lisinopril, Coreg.    Acute diastolic CHF: Markedly elevated LVEDP on left heart cath per cardiology    Pulmonary nodule: 11 mm nodule noted on CTA chest.  He had a B read chest x-ray 9/14/2023 at [facility] radiology for beryllium exposure last  "year with \"no pulmonary densities to suggest pneumoconiosis.\"  He will need a repeat CT chest in 3 months.    Disposition: Inpatient    Reviewed treatment plans with the patient and/or family.   30 minutes spent in face to face interaction and coordination of care.     Electronically signed by Jose eMier MD on 6/1/2024 at 09:00 CDT    "

## 2024-06-02 ENCOUNTER — READMISSION MANAGEMENT (OUTPATIENT)
Dept: CALL CENTER | Facility: HOSPITAL | Age: 54
End: 2024-06-02
Payer: OTHER GOVERNMENT

## 2024-06-02 VITALS
HEIGHT: 60 IN | RESPIRATION RATE: 16 BRPM | WEIGHT: 164.4 LBS | DIASTOLIC BLOOD PRESSURE: 62 MMHG | OXYGEN SATURATION: 94 % | TEMPERATURE: 98.5 F | BODY MASS INDEX: 32.28 KG/M2 | SYSTOLIC BLOOD PRESSURE: 92 MMHG | HEART RATE: 87 BPM

## 2024-06-02 PROBLEM — I21.9 TYPE 1 MYOCARDIAL INFARCTION: Status: ACTIVE | Noted: 2024-06-02

## 2024-06-02 LAB
ALBUMIN SERPL-MCNC: 3.8 G/DL (ref 3.5–5.2)
ALBUMIN/GLOB SERPL: 1.2 G/DL
ALP SERPL-CCNC: 78 U/L (ref 39–117)
ALT SERPL W P-5'-P-CCNC: 17 U/L (ref 1–41)
ANION GAP SERPL CALCULATED.3IONS-SCNC: 13 MMOL/L (ref 5–15)
AST SERPL-CCNC: 16 U/L (ref 1–40)
BASOPHILS # BLD AUTO: 0.04 10*3/MM3 (ref 0–0.2)
BASOPHILS NFR BLD AUTO: 0.5 % (ref 0–1.5)
BILIRUB SERPL-MCNC: 1 MG/DL (ref 0–1.2)
BUN SERPL-MCNC: 21 MG/DL (ref 6–20)
BUN/CREAT SERPL: 19.3 (ref 7–25)
CALCIUM SPEC-SCNC: 8.8 MG/DL (ref 8.6–10.5)
CHLORIDE SERPL-SCNC: 96 MMOL/L (ref 98–107)
CO2 SERPL-SCNC: 25 MMOL/L (ref 22–29)
CREAT SERPL-MCNC: 1.09 MG/DL (ref 0.76–1.27)
DEPRECATED RDW RBC AUTO: 47.8 FL (ref 37–54)
EGFRCR SERPLBLD CKD-EPI 2021: 79.7 ML/MIN/1.73
EOSINOPHIL # BLD AUTO: 0.06 10*3/MM3 (ref 0–0.4)
EOSINOPHIL NFR BLD AUTO: 0.8 % (ref 0.3–6.2)
ERYTHROCYTE [DISTWIDTH] IN BLOOD BY AUTOMATED COUNT: 17.6 % (ref 12.3–15.4)
GLOBULIN UR ELPH-MCNC: 3.2 GM/DL
GLUCOSE SERPL-MCNC: 135 MG/DL (ref 65–99)
HCT VFR BLD AUTO: 51.5 % (ref 37.5–51)
HGB BLD-MCNC: 15.9 G/DL (ref 13–17.7)
IMM GRANULOCYTES # BLD AUTO: 0.06 10*3/MM3 (ref 0–0.05)
IMM GRANULOCYTES NFR BLD AUTO: 0.8 % (ref 0–0.5)
LYMPHOCYTES # BLD AUTO: 1.02 10*3/MM3 (ref 0.7–3.1)
LYMPHOCYTES NFR BLD AUTO: 12.8 % (ref 19.6–45.3)
MCH RBC QN AUTO: 24.9 PG (ref 26.6–33)
MCHC RBC AUTO-ENTMCNC: 30.9 G/DL (ref 31.5–35.7)
MCV RBC AUTO: 80.7 FL (ref 79–97)
MONOCYTES # BLD AUTO: 0.53 10*3/MM3 (ref 0.1–0.9)
MONOCYTES NFR BLD AUTO: 6.6 % (ref 5–12)
NEUTROPHILS NFR BLD AUTO: 6.27 10*3/MM3 (ref 1.7–7)
NEUTROPHILS NFR BLD AUTO: 78.5 % (ref 42.7–76)
NRBC BLD AUTO-RTO: 0 /100 WBC (ref 0–0.2)
PLATELET # BLD AUTO: 254 10*3/MM3 (ref 140–450)
PMV BLD AUTO: 9.9 FL (ref 6–12)
POTASSIUM SERPL-SCNC: 4.3 MMOL/L (ref 3.5–5.2)
PROT SERPL-MCNC: 7 G/DL (ref 6–8.5)
RBC # BLD AUTO: 6.38 10*6/MM3 (ref 4.14–5.8)
SODIUM SERPL-SCNC: 134 MMOL/L (ref 136–145)
WBC NRBC COR # BLD AUTO: 7.98 10*3/MM3 (ref 3.4–10.8)

## 2024-06-02 PROCEDURE — 80053 COMPREHEN METABOLIC PANEL: CPT | Performed by: FAMILY MEDICINE

## 2024-06-02 PROCEDURE — 85025 COMPLETE CBC W/AUTO DIFF WBC: CPT | Performed by: FAMILY MEDICINE

## 2024-06-02 PROCEDURE — 25010000002 ENOXAPARIN PER 10 MG: Performed by: INTERNAL MEDICINE

## 2024-06-02 PROCEDURE — 25010000002 FUROSEMIDE PER 20 MG: Performed by: INTERNAL MEDICINE

## 2024-06-02 RX ORDER — CLOPIDOGREL BISULFATE 75 MG/1
75 TABLET ORAL DAILY
Qty: 30 TABLET | Refills: 0 | Status: SHIPPED | OUTPATIENT
Start: 2024-06-02

## 2024-06-02 RX ORDER — CARVEDILOL 12.5 MG/1
12.5 TABLET ORAL 2 TIMES DAILY WITH MEALS
Qty: 60 TABLET | Refills: 0 | Status: SHIPPED | OUTPATIENT
Start: 2024-06-02

## 2024-06-02 RX ORDER — FUROSEMIDE 20 MG/1
40 TABLET ORAL
Status: DISCONTINUED | OUTPATIENT
Start: 2024-06-02 | End: 2024-06-02 | Stop reason: HOSPADM

## 2024-06-02 RX ORDER — ATORVASTATIN CALCIUM 40 MG/1
40 TABLET, FILM COATED ORAL NIGHTLY
Qty: 90 TABLET | Refills: 0 | Status: SHIPPED | OUTPATIENT
Start: 2024-06-02

## 2024-06-02 RX ADMIN — SERTRALINE 150 MG: 50 TABLET, FILM COATED ORAL at 08:52

## 2024-06-02 RX ADMIN — FUROSEMIDE 40 MG: 10 INJECTION, SOLUTION INTRAMUSCULAR; INTRAVENOUS at 05:31

## 2024-06-02 RX ADMIN — ENOXAPARIN SODIUM 80 MG: 100 INJECTION SUBCUTANEOUS at 05:32

## 2024-06-02 RX ADMIN — ASPIRIN 81 MG: 81 TABLET, COATED ORAL at 08:52

## 2024-06-02 RX ADMIN — LISINOPRIL 40 MG: 20 TABLET ORAL at 08:52

## 2024-06-02 RX ADMIN — AMLODIPINE BESYLATE 10 MG: 10 TABLET ORAL at 08:52

## 2024-06-02 NOTE — PLAN OF CARE
Goal Outcome Evaluation:  Plan of Care Reviewed With: patient        Progress: improving  Outcome Evaluation: AAox4, VSS, Pt Right groin drg c/d/i, pt has no c/o pain, walked to toilet without assistance or incident, had BM tonight. Pt anticipates going home in the am. Pt has no requests at this time, SR [id number] on tele. safety maintained, care plan ongoing.

## 2024-06-02 NOTE — OUTREACH NOTE
Prep Survey      Flowsheet Row Responses   [facility] facility patient discharged from? [facility]   Is LACE score < 7 ? No   Eligibility Readm Mgmt   Discharge diagnosis Non-STEMI (non-ST elevated myocardial infarction)   Does the patient have one of the following disease processes/diagnoses(primary or secondary)? Acute MI (STEMI,NSTEMI)   Does the patient have Home health ordered? No   Is there a DME ordered? No   Prep survey completed? Yes            DAKOTA KYLE - Registered Nurse

## 2024-06-02 NOTE — DISCHARGE SUMMARY
Hospital Discharge Summary    Lilia Smart  :  1970  MRN:  [id number]    Admit date:  2024  Discharge date:  2024    Admitting Physician:    Steve Horton MD    Discharge Diagnoses:      Non-STEMI (non-ST elevated myocardial infarction)    Hypertensive urgency    Type 1 myocardial infarction      Hospital Course:   Patient is a 54-year-old male who was admitted with NSTEMI to the intensive service in the ICU.  Cardiac catheterization performed 2024 by cardiology.  He had dissection of circumflex artery noted but no intervention required.  It was presumed secondary to his poorly controlled blood pressure.  He was on Cardene drip in the ICU with better control of his blood pressure.  Ultimately was able to get off of the IV blood pressure medications and transition to oral medications.  Was transferred to the floor on the evening of 2024.  Ultimately continued to have stable blood pressure and was cleared for discharge by cardiology.  He was discharged home in stable condition.    The patient was admitted for the above noted medical/surgical issues. Please see daily progress note for further details concerning their stay. The patient improved throughout their stay and reached maximum medical improvement on the day of discharge. The patient/family agree with the treatment plan as outlined above. All questions concerning their stay were answered prior to discharge. They understand the importance of follow up concerning any abnormal test results.     Physical Exam  Vitals reviewed.   Constitutional:       Appearance: Normal appearance.   HENT:      Head: Normocephalic and atraumatic.   Eyes:      General:         Right eye: No discharge.         Left eye: No discharge.      Extraocular Movements: Extraocular movements intact.      Conjunctiva/sclera: Conjunctivae normal.   Cardiovascular:      Rate and Rhythm: Normal rate and regular rhythm.      Pulses: Normal pulses.      Heart  sounds: No murmur heard.  Pulmonary:      Effort: Pulmonary effort is normal. No respiratory distress.      Breath sounds: No wheezing.   Abdominal:      General: Abdomen is flat. Bowel sounds are normal. There is no distension.      Tenderness: There is no abdominal tenderness.   Skin:     Capillary Refill: Capillary refill takes less than 2 seconds.   Neurological:      General: No focal deficit present.      Mental Status: He is alert and oriented to person, place, and time.   Psychiatric:         Mood and Affect: Mood normal.           Discharge Medications:         Discharge Medications        New Medications        Instructions Start Date   atorvastatin 40 MG tablet  Commonly known as: LIPITOR   40 mg, Oral, Nightly      carvedilol 12.5 MG tablet  Commonly known as: COREG   12.5 mg, Oral, 2 Times Daily With Meals      clopidogrel 75 MG tablet  Commonly known as: PLAVIX   75 mg, Oral, Daily             Continue These Medications        Instructions Start Date   amLODIPine-valsartan  MG per tablet  Commonly known as: EXFORGE   1 tablet, Oral, Daily      aspirin 81 MG EC tablet   Take 2 tablets by mouth Daily.      sertraline 100 MG tablet  Commonly known as: ZOLOFT   Take 1.5 tablets by mouth Daily.             Stop These Medications      nebivolol 20 MG tablet  Commonly known as: BYSTOLIC              Activity: As tolerated    Diet: Cardiac diet low in added salt and fats    Consults: Cardiology, ICU    Significant Diagnostic Studies:    Adult Transthoracic Echo Complete W/ Cont if Necessary Per Protocol    Addendum Date: 5/31/2024      Left ventricular systolic function is normal. Left ventricular ejection fraction appears to be 61 - 65%.   Left ventricular wall thickness is consistent with mild concentric hypertrophy.   Left ventricular diastolic function is consistent with (grade I) impaired relaxation.   Normal right ventricular cavity size and systolic function noted.   There is no significant  (greater than mild) valvular dysfunction.     XR Chest 1 View    Result Date: 5/30/2024  Shallow inspiration, no acute cardiopulmonary abnormality.   This report was signed and finalized on 5/30/2024 10:57 AM by Dr. Taye Magallon MD.      CT Angiogram Chest    Result Date: 5/30/2024  1. No evidence of aortic aneurysm or dissection. 2. No evidence of pulmonary embolism. 3. An 11 mm nodule in the superior segment of the left lower lobe. The possibility of neoplasm may not be excluded. Further evaluation by a follow-up CT scan of the chest in 3 months and/or PET/CT imaging may be obtained.            This report was signed and finalized on 5/30/2024 10:34 AM by Dr. Myranda Mccartney MD.            Treatments:   As above    Disposition:   Discharge home    Time spent on discharge including discussion with patient/family, SW, and coordination of care.     Follow up with Daniel Mckeon MD in 1 weeks.    Signed:  Jose Meier MD   6/2/2024, 09:21 CDT

## 2024-06-02 NOTE — PROGRESS NOTES
"[facility] -  Progress Note     LOS: 3 days   Patient Care Team:  Daniel Mckeon MD as PCP - General (Sports Medicine)  Ramiro Patel DO as Consulting Physician (Gastroenterology)    Chief Complaint: F/U HTN    Subjective   Sitting up in bed, complaining of ongoing fatigue and nausea this morning.       REVIEW OF SYSTEMS:  Constitutional: Denies fever or chills. + fatigue  HEENT: Denies headaches or visual disturbances. Denies difficulty swallowing or sore throat.  Respiratory: Denies cough or hoarseness. Denies shortness of breath.   Cardiovascular: Denies chest pain or pressure. Denies palpitations. Denies presyncope/syncope. Denies orthopnea/PND. Denies lower extremity edema.   Gastrointestinal: Denies abdominal pain. + nausea without vomiting - \"I can't eat my breakfast.\" Denies change in bowel habits or history of recent GI tract blood loss.   Genitourinary: Denies urinary urgency or frequency. Denies dysuria, hematuria.  Musculoskeletal: Denies pain or swelling in joints.  Neurological: Denies paresthesias. Denies headache. Denies seizure or stroke symptoms.  Behavioral/Psych: Denies problems with anxiety or depression.    All other systems are negative except where stated above.      Objective     Vital Sign Min/Max for last 24 hours  Temp  Min: 97.5 °F (36.4 °C)  Max: 98.9 °F (37.2 °C)   BP  Min: 96/63  Max: 117/76   Pulse  Min: 69  Max: 77   Resp  Min: 16  Max: 16   SpO2  Min: 93 %  Max: 97 %   No data recorded   No data recorded         05/31/24 2048   Weight: 74.6 kg (164 lb 6.4 oz)         Intake/Output Summary (Last 24 hours) at 6/2/2024 0714  Last data filed at 6/2/2024 0431  Gross per 24 hour   Intake 680 ml   Output --   Net 680 ml         Physical Exam:    General Appearance:   Alert, cooperative, in no acute distress. Appears older than stated age.   HEENT:   Normocephalic. Atraumatic. MICHAEL.    Lungs:   Clear to auscultation, respirations regular, even and " unlabored    Heart:   Regular rhythm and normal rate, normal S1 and S2, no murmur, no gallop, no rub, no click   Abdomen:   Normal bowel sounds, soft,  non-tender, non-distended   Extremities:   Moves all extremities, no edema, no cyanosis, no redness   Pulses:   Pulses palpable and equal bilaterally   Skin:   No bleeding, bruising or rash   Neurologic:   Grossly intact       Results Review:   Lab Results (last 72 hours)       Procedure Component Value Units Date/Time    CBC & Differential [[id number]]  (Abnormal) Collected: 06/02/24 0650    Specimen: Blood Updated: 06/02/24 0731    Narrative:      The following orders were created for panel order CBC & Differential.  Procedure                               Abnormality         Status                     ---------                               -----------         ------                     CBC Auto Differential[[id number]]        Abnormal            Final result                 Please view results for these tests on the individual orders.    CBC Auto Differential [[id number]]  (Abnormal) Collected: 06/02/24 0650    Specimen: Blood Updated: 06/02/24 0731     WBC 7.98 10*3/mm3      RBC 6.38 10*6/mm3      Hemoglobin 15.9 g/dL      Hematocrit 51.5 %      MCV 80.7 fL      MCH 24.9 pg      MCHC 30.9 g/dL      RDW 17.6 %      RDW-SD 47.8 fl      MPV 9.9 fL      Platelets 254 10*3/mm3      Neutrophil % 78.5 %      Lymphocyte % 12.8 %      Monocyte % 6.6 %      Eosinophil % 0.8 %      Basophil % 0.5 %      Immature Grans % 0.8 %      Neutrophils, Absolute 6.27 10*3/mm3      Lymphocytes, Absolute 1.02 10*3/mm3      Monocytes, Absolute 0.53 10*3/mm3      Eosinophils, Absolute 0.06 10*3/mm3      Basophils, Absolute 0.04 10*3/mm3      Immature Grans, Absolute 0.06 10*3/mm3      nRBC 0.0 /100 WBC     Comprehensive Metabolic Panel [[id number]] Collected: 06/02/24 0650    Specimen: Blood Updated: 06/02/24 0726    CBC & Differential [[id number]]  (Abnormal) Collected: 06/01/24 1126     Specimen: Blood Updated: 06/01/24 1202    Narrative:      The following orders were created for panel order CBC & Differential.  Procedure                               Abnormality         Status                     ---------                               -----------         ------                     CBC Auto Differential[[id number]]        Abnormal            Final result                 Please view results for these tests on the individual orders.    CBC Auto Differential [[id number]]  (Abnormal) Collected: 06/01/24 1128    Specimen: Blood Updated: 06/01/24 1202     WBC 7.90 10*3/mm3      RBC 6.26 10*6/mm3      Hemoglobin 16.1 g/dL      Hematocrit 50.6 %      MCV 80.8 fL      MCH 25.7 pg      MCHC 31.8 g/dL      RDW 18.0 %      RDW-SD 47.6 fl      MPV 9.9 fL      Platelets 270 10*3/mm3     Narrative:      The previously reported component NRBC is no longer being reported. Previous result was 0.0 /100 WBC (Reference Range: 0.0-0.2 /100 WBC) on 6/1/2024 at 1139 CDT.    Manual Differential [[id number]]  (Abnormal) Collected: 06/01/24 1128    Specimen: Blood Updated: 06/01/24 1202     Neutrophil % 56.1 %      Lymphocyte % 23.4 %      Monocyte % 2.8 %      Eosinophil % 1.9 %      Basophil % 0.0 %      Bands %  4.7 %      Atypical Lymphocyte % 11.2 %      Neutrophils Absolute 4.80 10*3/mm3      Lymphocytes Absolute 2.73 10*3/mm3      Monocytes Absolute 0.22 10*3/mm3      Eosinophils Absolute 0.15 10*3/mm3      Basophils Absolute 0.00 10*3/mm3      Anisocytosis Slight/1+     Polychromasia Slight/1+     WBC Morphology Normal     Platelet Morphology Normal    Comprehensive Metabolic Panel [[id number]]  (Abnormal) Collected: 06/01/24 1128    Specimen: Blood Updated: 06/01/24 1157     Glucose 143 mg/dL      BUN 19 mg/dL      Creatinine 0.86 mg/dL      Sodium 134 mmol/L      Potassium 4.1 mmol/L      Chloride 99 mmol/L      CO2 25.0 mmol/L      Calcium 9.7 mg/dL      Total Protein 6.8 g/dL      Albumin 3.8 g/dL      ALT (SGPT)  17 U/L      AST (SGOT) 15 U/L      Alkaline Phosphatase 79 U/L      Total Bilirubin 0.8 mg/dL      Globulin 3.0 gm/dL      A/G Ratio 1.3 g/dL      BUN/Creatinine Ratio 22.1     Anion Gap 10.0 mmol/L      eGFR 101.6 mL/min/1.73     Narrative:      GFR Normal >60  Chronic Kidney Disease <60  Kidney Failure <15      Basic Metabolic Panel [[id number]]  (Abnormal) Collected: 05/31/24 0226    Specimen: Blood Updated: 05/31/24 0314     Glucose 161 mg/dL      BUN 10 mg/dL      Creatinine 0.90 mg/dL      Sodium 136 mmol/L      Potassium 3.8 mmol/L      Chloride 100 mmol/L      CO2 26.0 mmol/L      Calcium 9.0 mg/dL      BUN/Creatinine Ratio 11.1     Anion Gap 10.0 mmol/L      eGFR 100.2 mL/min/1.73     Narrative:      GFR Normal >60  Chronic Kidney Disease <60  Kidney Failure <15      Lipid Panel [[id number]]  (Abnormal) Collected: 05/31/24 0226    Specimen: Blood Updated: 05/31/24 0314     Total Cholesterol 123 mg/dL      Triglycerides 198 mg/dL      HDL Cholesterol 30 mg/dL      LDL Cholesterol  60 mg/dL      VLDL Cholesterol 33 mg/dL      LDL/HDL Ratio 1.78    Narrative:      Cholesterol Reference Ranges  (U.S. Department of Health and Human Services ATP III Classifications)    Desirable          <200 mg/dL  Borderline High    200-239 mg/dL  High Risk          >240 mg/dL      Triglyceride Reference Ranges  (U.S. Department of Health and Human Services ATP III Classifications)    Normal           <150 mg/dL  Borderline High  150-199 mg/dL  High             200-499 mg/dL  Very High        >500 mg/dL    HDL Reference Ranges  (U.S. Department of Health and Human Services ATP III Classifications)    Low     <40 mg/dl (major risk factor for CHD)  High    >60 mg/dl ('negative' risk factor for CHD)        LDL Reference Ranges  (U.S. Department of Health and Human Services ATP III Classifications)    Optimal          <100 mg/dL  Near Optimal     100-129 mg/dL  Borderline High  130-159 mg/dL  High             160-189 mg/dL  Very High         >189 mg/dL    Hemoglobin A1c [[id number]]  (Abnormal) Collected: 05/31/24 0226    Specimen: Blood Updated: 05/31/24 0303     Hemoglobin A1C 6.90 %     Narrative:      Hemoglobin A1C Ranges:    Increased Risk for Diabetes  5.7% to 6.4%  Diabetes                     >= 6.5%  Diabetic Goal                < 7.0%    CBC (No Diff) [[id number]]  (Abnormal) Collected: 05/31/24 0226    Specimen: Blood Updated: 05/31/24 0255     WBC 8.76 10*3/mm3      RBC 6.41 10*6/mm3      Hemoglobin 16.0 g/dL      Hematocrit 52.2 %      MCV 81.4 fL      MCH 25.0 pg      MCHC 30.7 g/dL      RDW 18.3 %      RDW-SD 48.6 fl      MPV 10.2 fL      Platelets 252 10*3/mm3     High Sensitivity Troponin T 2Hr [[id number]]  (Abnormal) Collected: 05/30/24 1218    Specimen: Blood Updated: 05/30/24 1300     HS Troponin T 333 ng/L      Troponin T Delta 39 ng/L     Narrative:      High Sensitive Troponin T Reference Range:  <14.0 ng/L- Negative Female for AMI  <22.0 ng/L- Negative Male for AMI  >=14 - Abnormal Female indicating possible myocardial injury.  >=22 - Abnormal Male indicating possible myocardial injury.   Clinicians would have to utilize clinical acumen, EKG, Troponin, and serial changes to determine if it is an Acute Myocardial Infarction or myocardial injury due to an underlying chronic condition.         Fentanyl, Urine - Urine, Clean Catch [[id number]]  (Normal) Collected: 05/30/24 1053    Specimen: Urine, Clean Catch Updated: 05/30/24 1127     Fentanyl, Urine Negative    Narrative:      Negative Threshold:      Fentanyl 5 ng/mL     The normal value for the drug tested is negative. This report includes final unconfirmed screening results to be used for medical treatment purposes only. Unconfirmed results must not be used for non-medical purposes such as employment or legal testing. Clinical consideration should be applied to any drug of abuse test, particularly when unconfirmed results are used.           Urine Drug Screen - Urine, Clean  Catch [[id number]]  (Normal) Collected: 05/30/24 1053    Specimen: Urine, Clean Catch Updated: 05/30/24 1116     THC, Screen, Urine Negative     Phencyclidine (PCP), Urine Negative     Cocaine Screen, Urine Negative     Methamphetamine, Ur Negative     Opiate Screen Negative     Amphetamine Screen, Urine Negative     Benzodiazepine Screen, Urine Negative     Tricyclic Antidepressants Screen Negative     Methadone Screen, Urine Negative     Barbiturates Screen, Urine Negative     Oxycodone Screen, Urine Negative     Buprenorphine, Screen, Urine Negative    Narrative:      Cutoff For Drugs Screened:    Amphetamines               500 ng/ml  Barbiturates               200 ng/ml  Benzodiazepines            150 ng/ml  Cocaine                    150 ng/ml  Methadone                  200 ng/ml  Opiates                    100 ng/ml  Phencyclidine               25 ng/ml  THC                         50 ng/ml  Methamphetamine            500 ng/ml  Tricyclic Antidepressants  300 ng/ml  Oxycodone                  100 ng/ml  Buprenorphine               10 ng/ml    The normal value for all drugs tested is negative. This report includes unconfirmed screening results, with the cutoff values listed, to be used for medical treatment purposes only.  Unconfirmed results must not be used for non-medical purposes such as employment or legal testing.  Clinical consideration should be applied to any drug of abuse test, particularly when unconfirmed results are used.      [place] Draw [[id number]] Collected: 05/30/24 0930    Specimen: Blood Updated: 05/30/24 1017    Narrative:      The following orders were created for panel order [place] Draw.  Procedure                               Abnormality         Status                     ---------                               -----------         ------                     Green Top (Gel)[[id number]]                                  Final result               Lavender Top[[id number]]                                      Final result               Red Top[[id number]]                                                                     Light Blue Top[[id number]]                                   Final result                 Please view results for these tests on the individual orders.    Comprehensive Metabolic Panel [[id number]]  (Abnormal) Collected: 05/30/24 0930    Specimen: Blood Updated: 05/30/24 1007     Glucose 132 mg/dL      BUN 4 mg/dL      Creatinine 0.69 mg/dL      Sodium 138 mmol/L      Potassium 4.6 mmol/L      Comment: Slight hemolysis detected by analyzer. Result may be falsely elevated.        Chloride 102 mmol/L      CO2 27.0 mmol/L      Calcium 8.9 mg/dL      Total Protein 7.8 g/dL      Albumin 4.3 g/dL      ALT (SGPT) 24 U/L      AST (SGOT) 39 U/L      Comment: Slight hemolysis detected by analyzer. Result may be falsely elevated.        Alkaline Phosphatase 100 U/L      Total Bilirubin 0.4 mg/dL      Globulin 3.5 gm/dL      A/G Ratio 1.2 g/dL      BUN/Creatinine Ratio 5.8     Anion Gap 9.0 mmol/L      eGFR 108.6 mL/min/1.73     Narrative:      GFR Normal >60  Chronic Kidney Disease <60  Kidney Failure <15      High Sensitivity Troponin T [[id number]]  (Abnormal) Collected: 05/30/24 0930    Specimen: Blood Updated: 05/30/24 1001     HS Troponin T 294 ng/L     Narrative:      High Sensitive Troponin T Reference Range:  <14.0 ng/L- Negative Female for AMI  <22.0 ng/L- Negative Male for AMI  >=14 - Abnormal Female indicating possible myocardial injury.  >=22 - Abnormal Male indicating possible myocardial injury.   Clinicians would have to utilize clinical acumen, EKG, Troponin, and serial changes to determine if it is an Acute Myocardial Infarction or myocardial injury due to an underlying chronic condition.         BNP [[id number]]  (Abnormal) Collected: 05/30/24 0930    Specimen: Blood Updated: 05/30/24 0959     proBNP 1,863.0 pg/mL     Narrative:      This assay is used as an aid in the diagnosis of  "individuals suspected of having heart failure. It can be used as an aid in the diagnosis of acute decompensated heart failure (ADHF) in patients presenting with signs and symptoms of ADHF to the emergency department (ED). In addition, NT-proBNP of <300 pg/mL indicates ADHF is not likely.    Age Range Result Interpretation  NT-proBNP Concentration (pg/mL:      <50             Positive            >450                   Gray                 300-450                    Negative             <300    50-75           Positive            >900                  Gray                300-900                  Negative            <300      >75             Positive            >1800                  Gray                300-1800                  Negative            <300    D-dimer, Quantitative [[id number]]  (Normal) Collected: 05/30/24 0930    Specimen: Blood Updated: 05/30/24 0957     D-Dimer, Quantitative <0.27 MCGFEU/mL     Narrative:      According to the assay 's published package insert, a normal (<0.50 MCGFEU/mL) D-dimer result in conjunction with a non-high clinical probability assessment, excludes deep vein thrombosis (DVT) and pulmonary embolism (PE) with high sensitivity.    D-dimer values increase with age and this can make VTE exclusion of an older population difficult. To address this, the [employer], based on best available evidence and recent guidelines, recommends that clinicians use age-adjusted D-dimer thresholds in patients greater than 50 years of age with: a) a low probability of PE who do not meet all Pulmonary Embolism Rule Out Criteria, or b) in those with intermediate probability of PE.   The formula for an age-adjusted D-dimer cut-off is \"age/100\".  For example, a 60 year old patient would have an age-adjusted cut-off of 0.60 MCGFEU/mL and an 80 year old 0.80 MCGFEU/mL.    Green Top (Gel) [[id number]] Collected: 05/30/24 0930    Specimen: Blood Updated: 05/30/24 0945     Extra " Tube Hold for add-ons.     Comment: Auto resulted.       Lavender Top [[id number]] Collected: 05/30/24 0930    Specimen: Blood Updated: 05/30/24 0945     Extra Tube hold for add-on     Comment: Auto resulted       Light Blue Top [[id number]] Collected: 05/30/24 0930    Specimen: Blood Updated: 05/30/24 0945     Extra Tube Hold for add-ons.     Comment: Auto resulted       CBC & Differential [[id number]]  (Abnormal) Collected: 05/30/24 0930    Specimen: Blood Updated: 05/30/24 0944    Narrative:      The following orders were created for panel order CBC & Differential.  Procedure                               Abnormality         Status                     ---------                               -----------         ------                     CBC Auto Differential[[id number]]        Abnormal            Final result                 Please view results for these tests on the individual orders.    CBC Auto Differential [[id number]]  (Abnormal) Collected: 05/30/24 0930    Specimen: Blood Updated: 05/30/24 0944     WBC 8.16 10*3/mm3      RBC 6.77 10*6/mm3      Hemoglobin 16.8 g/dL      Hematocrit 55.2 %      MCV 81.5 fL      MCH 24.8 pg      MCHC 30.4 g/dL      RDW 18.0 %      RDW-SD 49.1 fl      MPV 9.6 fL      Platelets 277 10*3/mm3      Neutrophil % 63.9 %      Lymphocyte % 23.3 %      Monocyte % 10.8 %      Eosinophil % 0.7 %      Basophil % 0.9 %      Immature Grans % 0.4 %      Neutrophils, Absolute 5.22 10*3/mm3      Lymphocytes, Absolute 1.90 10*3/mm3      Monocytes, Absolute 0.88 10*3/mm3      Eosinophils, Absolute 0.06 10*3/mm3      Basophils, Absolute 0.07 10*3/mm3      Immature Grans, Absolute 0.03 10*3/mm3      nRBC 0.0 /100 WBC              2D Echocardiogram:    Left ventricular systolic function is normal. Left ventricular ejection fraction appears to be 61 - 65%.    Left ventricular wall thickness is consistent with mild concentric hypertrophy.    Left ventricular diastolic function is consistent with (grade I)  impaired relaxation.    Normal right ventricular cavity size and systolic function noted.    There is no significant (greater than mild) valvular dysfunction.        Medication Review: yes  Current Facility-Administered Medications   Medication Dose Route Frequency Provider Last Rate Last Admin    acetaminophen (TYLENOL) tablet 650 mg  650 mg Oral Q4H PRN Andrez Abrams MD        amLODIPine (NORVASC) tablet 10 mg  10 mg Oral Q24H Andrez Abrams MD   10 mg at 06/01/24 0959    And    lisinopril (PRINIVIL,ZESTRIL) tablet 40 mg  40 mg Oral Q24H Andrez Abrams MD   40 mg at 06/01/24 0959    aspirin EC tablet 81 mg  81 mg Oral Daily Andrez Abrams MD   81 mg at 06/01/24 0958    atorvastatin (LIPITOR) tablet 40 mg  40 mg Oral Nightly Steve Horton MD   40 mg at 06/01/24 2044    carvedilol (COREG) tablet 12.5 mg  12.5 mg Oral BID With Meals Steve Horton MD   12.5 mg at 06/01/24 1822    clopidogrel (PLAVIX) tablet 75 mg  75 mg Oral Daily Andrez Abrams MD   75 mg at 06/01/24 1822    Enoxaparin Sodium (LOVENOX) syringe 80 mg  1 mg/kg Subcutaneous Q12H Andrez Abrams MD   80 mg at 06/02/24 0532    furosemide (LASIX) tablet 40 mg  40 mg Oral BID Jennifer Mcpherson APRN        hydrALAZINE (APRESOLINE) injection 10 mg  10 mg Intravenous Q6H PRN Andrez Abrams MD   10 mg at 05/31/24 0343    ondansetron ODT (ZOFRAN-ODT) disintegrating tablet 4 mg  4 mg Oral Q6H PRN Andrez Abrams MD        Or    ondansetron (ZOFRAN) injection 4 mg  4 mg Intravenous Q6H PRN Andrez Abrams MD        sertraline (ZOLOFT) tablet 150 mg  150 mg Oral Daily Andrez Abrams MD   150 mg at 06/01/24 0958    sodium chloride 0.9 % flush 10 mL  10 mL Intravenous PRN Andrez Abrams MD        sodium chloride 0.9 % flush 10 mL  10 mL Intravenous Andrez Cordova MD             Assessment & Plan       Non-STEMI (non-ST elevated myocardial infarction) 2'  to coronary artery dissection and hypertensive urgency - patient denies any chest pain. On ASA 81 mg, Plavix 75 mg daily, carvedilol 12.5 mg BID, Lotrel 10-40 mg daily, and atorvastatin 40 mg nightly    Hypertensive urgency - BP better controlled. No prn medication received since 05/31/2024 at 0330. Continue carvedilol 12.5 mg BID and Lotrel 10-40 mg daily.     Acute HFpEF with elevated LVEDP on LHC - patient appears compensated. No accurate I/O. Change IV lasix 40 mg q12hr to oral. Continue Lotrel 10-40 mg daily and carvedilol 12.5 mg BID.     Pulmonary nodule, LLL - will need follow-up with PCP    Tobacco abuse - encouraged smoking cessation    Polycythemia, likely 2' to chronic cigarette smoking    Patient is stable from cardiology standpoint for discharge. Recommend follow-up in heart failure clinic in 1 - 2 weeks.       DIVYA Watts  06/02/24  07:49 CDT

## 2024-06-03 NOTE — PAYOR COMM NOTE
"REF:   [id number]     [facility]  FAX   [phone]         Lilia Jaeger (54 y.o. Male)       Date of Birth   1970    Social Security Number       Address   [address] [place] [address]    Home Phone   [phone]    MRN   [id number]       [employer] orth[employer]   [facility]    Marital Status                               Admission Date   24    Admission Type   Emergency    Admitting Provider   Steve Horton MD    Attending Provider       Department, Room/Bed   [facility] 4B, 402/1       Discharge Date   2024    Discharge Disposition   Home or Self Care    Discharge Destination                                 Attending Provider: (none)   Allergies: Cat Hair Extract    Isolation: None   Infection: None   Code Status: Prior    Ht: 152.4 cm (60\")   Wt: 74.6 kg (164 lb 6.4 oz)    Admission Cmt: None   Principal Problem: Non-STEMI (non-ST elevated myocardial infarction) [I21.4]                   Active Insurance as of 2024       Primary Coverage       Payor Plan Insurance Group Employer/Plan Group    [facility] [employer] [facility] [employer]        Payor Plan Address Payor Plan Phone Number Payor Plan Fax Number Effective Dates    [address] [address]   2024 - None Entered    [facility] [id number]         Subscriber Name Subscriber Birth Date Member ID       LILIA JAEGER 1970 [id number]                     Emergency Contacts        (Rel.) Home Phone Work Phone Mobile Phone    JOHANA ORTIZ (Sister) -- -- [phone]                 Discharge Summary        Jose Meier MD at 24 0821            Hospital Discharge Summary    Lilia Jaeger  :  1970  MRN:  [id number]    Admit date:  2024  Discharge date:  2024    Admitting Physician:    Steve Horton MD    Discharge Diagnoses:      Non-STEMI (non-ST elevated myocardial infarction)    Hypertensive urgency    Type 1 myocardial " infarction      Hospital Course:   Patient is a 54-year-old male who was admitted with NSTEMI to the intensive service in the ICU.  Cardiac catheterization performed 5/30/2024 by cardiology.  He had dissection of circumflex artery noted but no intervention required.  It was presumed secondary to his poorly controlled blood pressure.  He was on Cardene drip in the ICU with better control of his blood pressure.  Ultimately was able to get off of the IV blood pressure medications and transition to oral medications.  Was transferred to the floor on the evening of 5/31/2024.  Ultimately continued to have stable blood pressure and was cleared for discharge by cardiology.  He was discharged home in stable condition.    The patient was admitted for the above noted medical/surgical issues. Please see daily progress note for further details concerning their stay. The patient improved throughout their stay and reached maximum medical improvement on the day of discharge. The patient/family agree with the treatment plan as outlined above. All questions concerning their stay were answered prior to discharge. They understand the importance of follow up concerning any abnormal test results.     Physical Exam  Vitals reviewed.   Constitutional:       Appearance: Normal appearance.   HENT:      Head: Normocephalic and atraumatic.   Eyes:      General:         Right eye: No discharge.         Left eye: No discharge.      Extraocular Movements: Extraocular movements intact.      Conjunctiva/sclera: Conjunctivae normal.   Cardiovascular:      Rate and Rhythm: Normal rate and regular rhythm.      Pulses: Normal pulses.      Heart sounds: No murmur heard.  Pulmonary:      Effort: Pulmonary effort is normal. No respiratory distress.      Breath sounds: No wheezing.   Abdominal:      General: Abdomen is flat. Bowel sounds are normal. There is no distension.      Tenderness: There is no abdominal tenderness.   Skin:     Capillary Refill:  Capillary refill takes less than 2 seconds.   Neurological:      General: No focal deficit present.      Mental Status: He is alert and oriented to person, place, and time.   Psychiatric:         Mood and Affect: Mood normal.           Discharge Medications:         Discharge Medications        New Medications        Instructions Start Date   atorvastatin 40 MG tablet  Commonly known as: LIPITOR   40 mg, Oral, Nightly      carvedilol 12.5 MG tablet  Commonly known as: COREG   12.5 mg, Oral, 2 Times Daily With Meals      clopidogrel 75 MG tablet  Commonly known as: PLAVIX   75 mg, Oral, Daily             Continue These Medications        Instructions Start Date   amLODIPine-valsartan  MG per tablet  Commonly known as: EXFORGE   1 tablet, Oral, Daily      aspirin 81 MG EC tablet   Take 2 tablets by mouth Daily.      sertraline 100 MG tablet  Commonly known as: ZOLOFT   Take 1.5 tablets by mouth Daily.             Stop These Medications      nebivolol 20 MG tablet  Commonly known as: BYSTOLIC              Activity: As tolerated    Diet: Cardiac diet low in added salt and fats    Consults: Cardiology, ICU    Significant Diagnostic Studies:    Adult Transthoracic Echo Complete W/ Cont if Necessary Per Protocol    Addendum Date: 5/31/2024      Left ventricular systolic function is normal. Left ventricular ejection fraction appears to be 61 - 65%.   Left ventricular wall thickness is consistent with mild concentric hypertrophy.   Left ventricular diastolic function is consistent with (grade I) impaired relaxation.   Normal right ventricular cavity size and systolic function noted.   There is no significant (greater than mild) valvular dysfunction.     XR Chest 1 View    Result Date: 5/30/2024  Shallow inspiration, no acute cardiopulmonary abnormality.   This report was signed and finalized on 5/30/2024 10:57 AM by Dr. Taye Magallon MD.      CT Angiogram Chest    Result Date: 5/30/2024  1. No evidence of aortic  aneurysm or dissection. 2. No evidence of pulmonary embolism. 3. An 11 mm nodule in the superior segment of the left lower lobe. The possibility of neoplasm may not be excluded. Further evaluation by a follow-up CT scan of the chest in 3 months and/or PET/CT imaging may be obtained.            This report was signed and finalized on 5/30/2024 10:34 AM by Dr. Myranda Mccartney MD.            Treatments:   As above    Disposition:   Discharge home    Time spent on discharge including discussion with patient/family, SW, and coordination of care.     Follow up with Daniel Mckeon MD in 1 weeks.    Signed:  Camryn Meier MD   6/2/2024, 09:21 CDT      Electronically signed by Camryn Meier MD at 06/02/24 0921       Discharge Order (From admission, onward)       Start     Ordered    06/02/24 0922  Discharge patient  Once        Expected Discharge Date: 06/02/24   Discharge Disposition: Home or Self Care   Physician of Record for Attribution - Please select from Treatment Team: CAMRYN MEIER [[id number]]   Review needed by CMO to determine Physician of Record: No      Question Answer Comment   Physician of Record for Attribution - Please select from Treatment Team CAMRYN MEIER    Review needed by CMO to determine Physician of Record No        06/02/24 0921

## 2024-06-04 ENCOUNTER — TELEPHONE (OUTPATIENT)
Dept: CARDIOLOGY | Facility: CLINIC | Age: 54
End: 2024-06-04
Payer: OTHER GOVERNMENT

## 2024-06-04 NOTE — TELEPHONE ENCOUNTER
Let patient know that I will let him know if an appointment comes open before July 2nd. He verbalized understanding.

## 2024-06-04 NOTE — TELEPHONE ENCOUNTER
Caller: Lilia Smart    Relationship to patient: Self    Best call back number: [phone]    New or established patient?  [] New  [x] Established    Date of discharge: 6-2-24    Facility discharged from:  [facility]    Diagnosis/Symptoms:     Length of stay (If applicable):     Specialty Only: Did you see a [facility] provider?    [x] Yes  [] No  If so, who? DR. RICCI      1 WEEK HFU SCHEDULED FOR FIRST AVAILABLE OF 7-2-24 WITH HAMILTON SOTO

## 2024-06-05 ENCOUNTER — TELEPHONE (OUTPATIENT)
Dept: CARDIOLOGY | Facility: CLINIC | Age: 54
End: 2024-06-05
Payer: OTHER GOVERNMENT

## 2024-06-05 ENCOUNTER — TELEPHONE (OUTPATIENT)
Dept: CT IMAGING | Facility: HOSPITAL | Age: 54
End: 2024-06-05
Payer: OTHER GOVERNMENT

## 2024-06-05 NOTE — TELEPHONE ENCOUNTER
A notification letter was sent to the patient explaining that a recent imaging exam showed an incidental finding, for which follow-up testing may be recommended.  Message for patient  Routed to PCP  I also called Daniel Mckeon office and spoke with Brando to verify they have received results. They are planning on a 3 month f/u.

## 2024-06-05 NOTE — TELEPHONE ENCOUNTER
Patient called from work to get officially released. Said was currently in an office at work and medical just down the enriquez. Told him [facility] would have to call him about work release. Mars call him on cell phone at [phone]

## 2024-06-05 NOTE — TELEPHONE ENCOUNTER
Caller: Berry Lilia    Relationship: Self    Best call back number: [phone]     What form or medical record are you requesting: RELEASE TO WORK     Who is requesting this form or medical record from you: PADUCAH [employer]ANT (WORK)     How would you like to receive the form or medical records (pick-up, mail, fax):   If fax, what is the fax number: [phone]    Timeframe paperwork needed: TODAY ASAP    Additional notes: PATIENT IS NEEDING A RELEASE TO WORK, DATE ABLE TO RETURN, AND IF ANY RESTRICTIONS ARE NECESSARY.  DR. RICCI DONE PROCEDURE ON 5.30.24.

## 2024-06-07 ENCOUNTER — READMISSION MANAGEMENT (OUTPATIENT)
Dept: CALL CENTER | Facility: HOSPITAL | Age: 54
End: 2024-06-07
Payer: OTHER GOVERNMENT

## 2024-06-07 NOTE — OUTREACH NOTE
AMI Week 1 Survey      Flowsheet Row Responses   [facility] facility patient discharged from? [facility]   Does the patient have one of the following disease processes/diagnoses(primary or secondary)? Acute MI (STEMI,NSTEMI)   Week 1 attempt successful? No   Unsuccessful attempts Attempt 1            June BEAUCHAMP - Registered Nurse

## 2024-06-12 ENCOUNTER — READMISSION MANAGEMENT (OUTPATIENT)
Dept: CALL CENTER | Facility: HOSPITAL | Age: 54
End: 2024-06-12
Payer: OTHER GOVERNMENT

## 2024-06-12 NOTE — OUTREACH NOTE
AMI Week 1 Survey      Flowsheet Row Responses   [facility] facility patient discharged from? [facility]   Does the patient have one of the following disease processes/diagnoses(primary or secondary)? Acute MI (STEMI,NSTEMI)   Week 1 attempt successful? No   Unsuccessful attempts Attempt 2            DAKOTA KYLE - Registered Nurse

## 2024-06-20 ENCOUNTER — READMISSION MANAGEMENT (OUTPATIENT)
Dept: CALL CENTER | Facility: HOSPITAL | Age: 54
End: 2024-06-20
Payer: OTHER GOVERNMENT

## 2024-06-20 NOTE — OUTREACH NOTE
AMI Week 2 Survey      Flowsheet Row Responses   [facility] facility patient discharged from? [place]   Does the patient have one of the following disease processes/diagnoses(primary or secondary)? Acute MI (STEMI,NSTEMI)   Revoke Decline to participate            Ashley PORTILLO - Licensed Nurse

## 2024-07-02 ENCOUNTER — OFFICE VISIT (OUTPATIENT)
Dept: CARDIOLOGY | Facility: CLINIC | Age: 54
End: 2024-07-02
Payer: OTHER GOVERNMENT

## 2024-07-02 VITALS
HEART RATE: 80 BPM | DIASTOLIC BLOOD PRESSURE: 90 MMHG | BODY MASS INDEX: 34.16 KG/M2 | HEIGHT: 60 IN | WEIGHT: 174 LBS | OXYGEN SATURATION: 100 % | SYSTOLIC BLOOD PRESSURE: 140 MMHG

## 2024-07-02 DIAGNOSIS — I25.42 DISSECTION OF CORONARY ARTERY: Primary | ICD-10-CM

## 2024-07-02 DIAGNOSIS — I25.10 NONOBSTRUCTIVE ATHEROSCLEROSIS OF CORONARY ARTERY: ICD-10-CM

## 2024-07-02 DIAGNOSIS — I10 PRIMARY HYPERTENSION: ICD-10-CM

## 2024-07-02 PROBLEM — I21.9 TYPE 1 MYOCARDIAL INFARCTION: Status: RESOLVED | Noted: 2024-06-02 | Resolved: 2024-07-02

## 2024-07-02 PROBLEM — I16.0 HYPERTENSIVE URGENCY: Status: RESOLVED | Noted: 2024-06-01 | Resolved: 2024-07-02

## 2024-07-02 PROBLEM — I21.4 NON-STEMI (NON-ST ELEVATED MYOCARDIAL INFARCTION): Status: RESOLVED | Noted: 2024-05-30 | Resolved: 2024-07-02

## 2024-07-02 RX ORDER — ATORVASTATIN CALCIUM 40 MG/1
40 TABLET, FILM COATED ORAL NIGHTLY
Qty: 90 TABLET | Refills: 3 | Status: SHIPPED | OUTPATIENT
Start: 2024-07-02

## 2024-07-02 RX ORDER — CLOPIDOGREL BISULFATE 75 MG/1
75 TABLET ORAL DAILY
Qty: 90 TABLET | Refills: 1 | Status: SHIPPED | OUTPATIENT
Start: 2024-07-02

## 2024-07-02 RX ORDER — CARVEDILOL 12.5 MG/1
12.5 TABLET ORAL 2 TIMES DAILY WITH MEALS
Qty: 180 TABLET | Refills: 3 | Status: SHIPPED | OUTPATIENT
Start: 2024-07-02

## 2024-07-02 NOTE — PROGRESS NOTES
Subjective:     Encounter Date:07/02/2024      Patient ID: Lilia Smart is a 54 y.o. male with recent hospitalization due to NSTEMI and findings of nonobstructive CAD, coronary artery dissection, hypertension, diastolic dysfunction, alcohol use, polycythemia, lung nodule who presents today for discharge follow up.     Chief Complaint: Discharge Follow Up   History of Present Illness    Mr. Smart presents to the office today for discharge follow-up.  He was seen in the hospital a month ago due to complaints of chest pain.  He was found to have elevated blood enzymes and taken for further evaluation with coronary angiogram.  He was also noted to have a significantly elevated blood pressure upon arrival.    Coronary angiography revealed nonobstructive coronary artery disease with evidence of coronary artery dissection within the left circumflex that was not limiting flow within the artery.  Medical management was recommended.  He was started on dual antiplatelet therapy and aggressive management of blood pressure control.  Initially on a Cardene drip.  In addition he had elevated left ventricular end-diastolic pressures and was placed on IV diuretics throughout his hospitalization.    At discharge discharge, he was placed on new medications in terms of antihypertensives and it was recommended by cardiology that he be discharged on a diuretic regimen as well however this was not prescribed.  He has been compliant with his discharge medications including home medication Exforge, new medication of carvedilol, statin, dual antiplatelet therapy.    He reports intermittent episodes of chest tightness that are short-lived spontaneously resolving on their own with no associated symptoms.  He reports taking carvedilol tablets together in the a.m. as he was not aware that they were to be split taking 1 twice daily.  He has gained weight based off of multiple weights documented in the hospital but disagrees and  states that those weights during his hospitalization were errors. He does not think that he has had any weight gain since discharge.  Follow-up weights today appear to be 10 pounds higher than his discharge weight.    He feels that his anxiety is worse and plans to talk to his primary care doctor about this.  He is concerned about his blood pressure being too low or being dehydrated.  He states that his blood pressure has been better since discharge running lower than what he had traditionally ran in the past.  He understands that his blood pressure is still not to goal.  He is hesitant about diuretic therapy.  Reports no bleeding issues but easy bleeding with injury.  He denies orthopnea, PND.    He endorses fatigue.  He states that he feels that he needs to resume testosterone therapy.  He describes his primary care doctor being hesitant to do so as he already has underlying polycythemia that he states was present prior to testosterone use in the past.    The following portions of the patient's history were reviewed and updated as appropriate: allergies, current medications, past family history, past medical history, past social history, and past surgical history.    Allergies   Allergen Reactions    Cat Hair Extract Hives       Current Outpatient Medications:     amLODIPine-valsartan (EXFORGE)  MG per tablet, Take 1 tablet by mouth Daily., Disp: , Rfl:     aspirin 81 MG EC tablet, Take 2 tablets by mouth Daily., Disp: , Rfl:     atorvastatin (LIPITOR) 40 MG tablet, Take 1 tablet by mouth Every Night., Disp: 90 tablet, Rfl: 3    carvedilol (COREG) 12.5 MG tablet, Take 1 tablet by mouth 2 (Two) Times a Day With Meals., Disp: 180 tablet, Rfl: 3    clopidogrel (PLAVIX) 75 MG tablet, Take 1 tablet by mouth Daily., Disp: 90 tablet, Rfl: 1    sertraline (ZOLOFT) 100 MG tablet, Take 1.5 tablets by mouth Daily., Disp: , Rfl:       Review of Systems   Constitutional: Positive for malaise/fatigue.   Cardiovascular:   "Positive for chest pain. Negative for leg swelling, near-syncope, orthopnea, palpitations, paroxysmal nocturnal dyspnea and syncope.   Respiratory:  Negative for cough, shortness of breath and wheezing.    Hematologic/Lymphatic: Bruises/bleeds easily.   Psychiatric/Behavioral:  The patient is nervous/anxious.        Procedures       Objective:     Vitals reviewed.   Constitutional:       General: Awake. Not in acute distress.     Appearance: Normal appearance. Well-developed, well-groomed and not in distress. Obese.   HENT:      Head: Normocephalic and atraumatic.   Pulmonary:      Effort: Pulmonary effort is normal.      Breath sounds: Normal breath sounds. No wheezing. No rhonchi. No rales.   Cardiovascular:      Normal rate.      Murmurs: There is no murmur.      No gallop.  No rub.   Edema:     Peripheral edema absent.   Musculoskeletal:      Cervical back: Normal range of motion and neck supple. Skin:     General: Skin is warm and dry.   Neurological:      Mental Status: Alert, oriented to person, place, and time and oriented to person, place and time.   Psychiatric:         Attention and Perception: Attention normal.         Mood and Affect: Mood is anxious.         Speech: Speech normal.         Behavior: Behavior normal. Behavior is cooperative.         Thought Content: Thought content normal.         Cognition and Memory: Cognition and memory normal.         Judgment: Judgment normal.         /90   Pulse 80   Ht 152.4 cm (60\")   Wt 78.9 kg (174 lb)   SpO2 100%   BMI 33.98 kg/m²     Lab Review:   Results for orders placed during the hospital encounter of 05/30/24    Adult Transthoracic Echo Complete W/ Cont if Necessary Per Protocol    Interpretation Summary    Left ventricular systolic function is normal. Left ventricular ejection fraction appears to be 61 - 65%.    Left ventricular wall thickness is consistent with mild concentric hypertrophy.    Left ventricular diastolic function is consistent " with (grade I) impaired relaxation.    Normal right ventricular cavity size and systolic function noted.    There is no significant (greater than mild) valvular dysfunction.    Lab Results   Component Value Date    CHOL 123 05/31/2024    TRIG 198 (H) 05/31/2024    HDL 30 (L) 05/31/2024    LDL 60 05/31/2024     Cardiac Catheterization 5/30/2024 (kota)  Impression:  1.  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  Flow in this branch appears to be near normal with no obvious obstruction to flow at this time, therefore this dissection was left untreated as any attempts to treat the dissection might further propagate the defect and thus impair flow into the distal terminal branches.  2.  Moderate stenosis in a small caliber diagonal branch, otherwise mild disease in the LAD territory.  3.  Small caliber nondominant right coronary artery.  4.  Acute diastolic heart failure secondary to uncontrolled hypertension with marked elevation in left ventricular end-diastolic pressure.  5.  Preserved left ventricular systolic function.     Plan:  1. Routine post procedure orders, including 4 hours bedrest, gentle IV fluids for the next few hours then discontinue.  2.  Given significant increase in left ventricular end-diastolic pressure, initiate Lasix 40 mg IV every 12 hours.  3.  Initiate aspirin therapy, clopidogrel for medical management of NSTEMI.  Also, recommend therapeutic Lovenox for at least 48 hours at 1 mg/kg subcutaneous every 12 hours.  4.  Check echocardiogram to formally evaluate systolic function as well as valvular function.  5.  Aggressive risk factor modification medical management of underlying coronary artery disease.  6.  Aggressive blood pressure control, currently on a Cardene drip, resume home medications and titrate and/or add medications if necessary.      Assessment:          Diagnosis Plan    1. Dissection of coronary artery        2. Nonobstructive atherosclerosis of coronary artery        3. Primary hypertension               Plan:       -Coronary artery dissection/nonobstructive CAD: Patient was recently evaluated during inpatient hospitalization due to complaints of chest pain and elevated cardiac enzymes.  Noted to have dissection of his left circumflex coronary artery without limitation of blood flow.  Medical management was advised.  He remains on dual antiplatelet therapy with aspirin and clopidogrel.  He was placed on statin therapy prior to his discharge given findings of nonobstructive disease for risk factor management.    -Hypertension: Slightly elevated in the office today but improved from his hospitalization.  Medication adjustments were made during his hospitalization and continues on Exforge and carvedilol.  In addition cardiology was administering IV diuretics and oral diuretics throughout his hospitalization given LVEDP elevated on cath as well as hypertensive treatment and diastolic dysfunction.  He is hesitant about being on diuretic therapy.  I recommended the patient start spironolactone 25 mg daily for ongoing management of blood pressure as well as contributor to lower risks of symptoms associated with diastolic heart failure in the future.  He is to see his primary care doctor in the near future and will follow-up on lab work and blood pressure at that time with possible consideration of prescription spironolactone at that visit.      Recommend medication adjustments for blood pressure control-wants to discuss further with PCP  Continue current cardiac medications.  Follow-up in 6 months

## 2024-07-05 ENCOUNTER — TELEPHONE (OUTPATIENT)
Dept: CARDIOLOGY | Facility: CLINIC | Age: 54
End: 2024-07-05
Payer: OTHER GOVERNMENT

## 2024-07-05 DIAGNOSIS — I10 PRIMARY HYPERTENSION: Primary | ICD-10-CM

## 2024-07-05 RX ORDER — SPIRONOLACTONE 25 MG/1
25 TABLET ORAL DAILY
Qty: 30 TABLET | Refills: 11 | Status: SHIPPED | OUTPATIENT
Start: 2024-07-05

## 2024-07-05 NOTE — TELEPHONE ENCOUNTER
"Patient reports that he was to be prescribed a diuretic at LOV. It is mentioned that he was apprehensive on taking a diuretic and that he would discuss with his PCP. He has an appointment with his PCP at the end of the month, but he would rather discuss his BP and HF with cardiology. He has not been monitoring BP as good as he should be, but he does say he as a BP monitor at work and at home and will start writing it down. Advised to check randomly as well as morning and evening after taking medications. He does not weigh daily, advised to try and weigh the same time everyday. He reports no SOB,weight gain since LOV, no orthopnea,PND.He tells his PCP always told him \"everything is fine.\" He tells me he \"I will take whatever meds I needs to be taking.\"   "

## 2024-07-05 NOTE — TELEPHONE ENCOUNTER
Caller: ALIX    Relationship: SELF    Best call back number: [phone]    What is the best time to reach you: ANY    Who are you requesting to speak with (clinical staff, provider,  specific staff member): CLINICAL    Do you know the name of the person who called:     What was the call regarding: PATIENT CALLING IN REGARDING MEDICATION THAT THEY WERE TOLD AT THE APPOINTMENT THEY WOULD BE PUT ON BUT PHARMACY ISN'T SHOWING THAT. PATIENT STATES THAT ERYN SOTO WAS GOING TO PUT THEM ON A WATER PILL. PLEASE CALL PATIENT TO ADVISE. THANK YOU!    Is it okay if the provider responds through MyChart: NO

## 2024-07-22 ENCOUNTER — HOSPITAL ENCOUNTER (OUTPATIENT)
Dept: SLEEP CENTER | Age: 54
Discharge: HOME | End: 2024-07-24
Payer: OTHER GOVERNMENT

## 2024-07-22 PROCEDURE — G0399 HOME SLEEP TEST/TYPE 3 PORTA: HCPCS

## 2024-07-22 NOTE — PROGRESS NOTES
Mr. Clara Gould presented today in the sleep center for a Home Sleep Test (HST).  Mr. Gould was instructed on the device and was requested to wear the unit for two nights.  was asked to have the HST monitor back by 4PM on  07/24/2024. The patient acknowledged he understood. The HST device was tested and was in working order.

## 2024-07-23 PROCEDURE — G0399 HOME SLEEP TEST/TYPE 3 PORTA: HCPCS

## 2024-07-30 DIAGNOSIS — R06.2 WHEEZING: Primary | ICD-10-CM

## 2024-07-30 DIAGNOSIS — G47.33 OSA (OBSTRUCTIVE SLEEP APNEA): ICD-10-CM

## 2025-01-16 RX ORDER — CLOPIDOGREL BISULFATE 75 MG/1
75 TABLET ORAL DAILY
Qty: 90 TABLET | Refills: 1 | Status: SHIPPED | OUTPATIENT
Start: 2025-01-16

## 2025-07-14 RX ORDER — CARVEDILOL 12.5 MG/1
12.5 TABLET ORAL 2 TIMES DAILY WITH MEALS
Qty: 60 TABLET | Refills: 0 | Status: SHIPPED | OUTPATIENT
Start: 2025-07-14

## 2025-07-14 RX ORDER — ATORVASTATIN CALCIUM 40 MG/1
40 TABLET, FILM COATED ORAL
Qty: 30 TABLET | Refills: 0 | Status: SHIPPED | OUTPATIENT
Start: 2025-07-14

## 2025-08-14 RX ORDER — CARVEDILOL 12.5 MG/1
12.5 TABLET ORAL 2 TIMES DAILY WITH MEALS
Qty: 60 TABLET | Refills: 0 | Status: SHIPPED | OUTPATIENT
Start: 2025-08-14

## 2025-08-14 RX ORDER — ATORVASTATIN CALCIUM 40 MG/1
40 TABLET, FILM COATED ORAL
Qty: 30 TABLET | Refills: 0 | Status: SHIPPED | OUTPATIENT
Start: 2025-08-14

## (undated) DEVICE — CANN NASL ETCO2 LO/FLO A/

## (undated) DEVICE — PAD, DEFIB, ADULT, RADIOTRANS, PHYSIO: Brand: MEDLINE

## (undated) DEVICE — Device: Brand: MEDEX

## (undated) DEVICE — SOLIDIFIER LIQUI LOC PLUS 2000CC

## (undated) DEVICE — SOL IRR NACL 0.9PCT BT 1000ML

## (undated) DEVICE — PK CATH CARD 30 CA/4

## (undated) DEVICE — PINNACLE INTRODUCER SHEATH: Brand: PINNACLE

## (undated) DEVICE — Device

## (undated) DEVICE — PERCLOSE™ PROSTYLE™ SUTURE-MEDIATED CLOSURE AND REPAIR SYSTEM: Brand: PERCLOSE™ PROSTYLE™

## (undated) DEVICE — INF TL MULIPACK FR6: Brand: INFINITI